# Patient Record
Sex: MALE | Race: OTHER | HISPANIC OR LATINO
[De-identification: names, ages, dates, MRNs, and addresses within clinical notes are randomized per-mention and may not be internally consistent; named-entity substitution may affect disease eponyms.]

---

## 2017-01-18 ENCOUNTER — APPOINTMENT (OUTPATIENT)
Dept: FAMILY MEDICINE | Facility: CLINIC | Age: 52
End: 2017-01-18

## 2017-03-22 ENCOUNTER — APPOINTMENT (OUTPATIENT)
Dept: FAMILY MEDICINE | Facility: CLINIC | Age: 52
End: 2017-03-22

## 2017-03-22 VITALS
HEART RATE: 63 BPM | HEIGHT: 64 IN | DIASTOLIC BLOOD PRESSURE: 71 MMHG | BODY MASS INDEX: 30.73 KG/M2 | WEIGHT: 180 LBS | OXYGEN SATURATION: 97 % | SYSTOLIC BLOOD PRESSURE: 118 MMHG | TEMPERATURE: 98.7 F

## 2017-03-22 DIAGNOSIS — Z01.818 ENCOUNTER FOR OTHER PREPROCEDURAL EXAMINATION: ICD-10-CM

## 2017-04-10 ENCOUNTER — APPOINTMENT (OUTPATIENT)
Dept: CT IMAGING | Facility: CLINIC | Age: 52
End: 2017-04-10

## 2017-05-03 ENCOUNTER — APPOINTMENT (OUTPATIENT)
Dept: FAMILY MEDICINE | Facility: CLINIC | Age: 52
End: 2017-05-03
Payer: COMMERCIAL

## 2017-05-03 VITALS
DIASTOLIC BLOOD PRESSURE: 74 MMHG | BODY MASS INDEX: 30.73 KG/M2 | SYSTOLIC BLOOD PRESSURE: 121 MMHG | OXYGEN SATURATION: 98 % | HEIGHT: 64 IN | TEMPERATURE: 97.8 F | HEART RATE: 67 BPM | WEIGHT: 180 LBS

## 2017-05-03 DIAGNOSIS — Z91.89 OTHER SPECIFIED PERSONAL RISK FACTORS, NOT ELSEWHERE CLASSIFIED: ICD-10-CM

## 2017-05-03 DIAGNOSIS — L60.0 INGROWING NAIL: ICD-10-CM

## 2017-05-03 DIAGNOSIS — Z23 ENCOUNTER FOR IMMUNIZATION: ICD-10-CM

## 2017-05-03 PROCEDURE — 94010 BREATHING CAPACITY TEST: CPT

## 2017-05-03 PROCEDURE — 99213 OFFICE O/P EST LOW 20 MIN: CPT | Mod: 25

## 2017-05-03 PROCEDURE — 99396 PREV VISIT EST AGE 40-64: CPT | Mod: 25

## 2017-05-03 PROCEDURE — 93000 ELECTROCARDIOGRAM COMPLETE: CPT

## 2017-05-21 ENCOUNTER — FORM ENCOUNTER (OUTPATIENT)
Age: 52
End: 2017-05-21

## 2017-05-22 ENCOUNTER — APPOINTMENT (OUTPATIENT)
Dept: CT IMAGING | Facility: CLINIC | Age: 52
End: 2017-05-22

## 2017-05-22 ENCOUNTER — OUTPATIENT (OUTPATIENT)
Dept: OUTPATIENT SERVICES | Facility: HOSPITAL | Age: 52
LOS: 1 days | End: 2017-05-22
Payer: COMMERCIAL

## 2017-05-22 DIAGNOSIS — R19.01 RIGHT UPPER QUADRANT ABDOMINAL SWELLING, MASS AND LUMP: ICD-10-CM

## 2017-05-22 PROCEDURE — 74177 CT ABD & PELVIS W/CONTRAST: CPT

## 2017-05-22 PROCEDURE — 71046 X-RAY EXAM CHEST 2 VIEWS: CPT

## 2017-06-19 ENCOUNTER — APPOINTMENT (OUTPATIENT)
Dept: FAMILY MEDICINE | Facility: CLINIC | Age: 52
End: 2017-06-19

## 2017-07-23 ENCOUNTER — RX RENEWAL (OUTPATIENT)
Age: 52
End: 2017-07-23

## 2017-08-26 ENCOUNTER — RX RENEWAL (OUTPATIENT)
Age: 52
End: 2017-08-26

## 2017-08-31 ENCOUNTER — APPOINTMENT (OUTPATIENT)
Dept: GASTROENTEROLOGY | Facility: CLINIC | Age: 52
End: 2017-08-31

## 2017-11-16 ENCOUNTER — EMERGENCY (EMERGENCY)
Facility: HOSPITAL | Age: 52
LOS: 1 days | Discharge: HOME | End: 2017-11-16
Attending: EMERGENCY MEDICINE
Payer: COMMERCIAL

## 2017-11-16 VITALS
HEART RATE: 70 BPM | DIASTOLIC BLOOD PRESSURE: 75 MMHG | HEIGHT: 62 IN | OXYGEN SATURATION: 99 % | TEMPERATURE: 98 F | SYSTOLIC BLOOD PRESSURE: 116 MMHG | WEIGHT: 134.92 LBS | RESPIRATION RATE: 18 BRPM

## 2017-11-16 PROCEDURE — 99283 EMERGENCY DEPT VISIT LOW MDM: CPT | Mod: 25

## 2017-11-16 PROCEDURE — 12041 INTMD RPR N-HF/GENIT 2.5CM/<: CPT

## 2017-11-16 PROCEDURE — 99284 EMERGENCY DEPT VISIT MOD MDM: CPT | Mod: 25

## 2017-11-16 PROCEDURE — 73140 X-RAY EXAM OF FINGER(S): CPT

## 2017-11-16 PROCEDURE — 73140 X-RAY EXAM OF FINGER(S): CPT | Mod: 26,LT

## 2017-11-16 RX ORDER — CEPHALEXIN 500 MG
1 CAPSULE ORAL
Qty: 21 | Refills: 0 | OUTPATIENT
Start: 2017-11-16 | End: 2017-11-23

## 2017-11-16 RX ORDER — IBUPROFEN 200 MG
1 TABLET ORAL
Qty: 40 | Refills: 0 | OUTPATIENT
Start: 2017-11-16 | End: 2017-11-26

## 2017-11-16 RX ORDER — IBUPROFEN 200 MG
1 TABLET ORAL
Qty: 40 | Refills: 0
Start: 2017-11-16 | End: 2017-11-26

## 2017-11-16 RX ORDER — CEPHALEXIN 500 MG
1 CAPSULE ORAL
Qty: 21 | Refills: 0
Start: 2017-11-16 | End: 2017-11-23

## 2017-11-16 NOTE — ED STATDOCS - ATTENDING CONTRIBUTION TO CARE
I, Magdy Aguilar, performed the initial face to face bedside interview with this patient regarding history of present illness, review of symptoms and relevant past medical, social and family history.  I completed an independent physical examination.  I was the initial provider who evaluated this patient. I have signed out the follow up of any pending tests (i.e. labs, radiological studies) to the ACP.  I have communicated the patient’s plan of care and disposition with the ACP.

## 2017-11-16 NOTE — ED ADULT TRIAGE NOTE - CHIEF COMPLAINT QUOTE
Patient arrived to ED today with c/o fracture to his left phalanx.  patient was sent from [facility] for evaluation.

## 2017-11-16 NOTE — ED STATDOCS - OBJECTIVE STATEMENT
53 y/o M pt presents to ED c/o right thumb pain s/p getting his thumb stuck in a machine at work today.Pt is right hand dominant. Tetanus is UTD as of today s/p going to an Urgent Care. He also had an XR done that showed a fracture but unsure where. He was also given and Denies N/V/D, fever, chills, SOB, CP, and abdominal pain. No further complaints at this time.

## 2017-11-16 NOTE — ED ADULT NURSE NOTE - CHIEF COMPLAINT QUOTE
Patient arrived to ED today with c/o fracture to his left phalanx.  patient was sent from [facility] for evaluation.

## 2017-11-16 NOTE — ED STATDOCS - MEDICAL DECISION MAKING DETAILS
Crushed typed injury to tip of left thumb sent from Urgent care center for management. Broken none? of distal phalanx  with laceration involving nail bed Cd to radiology to upload films. Irrigate sutures with nail repair ?

## 2018-01-26 ENCOUNTER — APPOINTMENT (OUTPATIENT)
Dept: FAMILY MEDICINE | Facility: CLINIC | Age: 53
End: 2018-01-26
Payer: COMMERCIAL

## 2018-01-26 VITALS
HEART RATE: 65 BPM | BODY MASS INDEX: 31.58 KG/M2 | SYSTOLIC BLOOD PRESSURE: 112 MMHG | DIASTOLIC BLOOD PRESSURE: 72 MMHG | HEIGHT: 64 IN | TEMPERATURE: 97.7 F | OXYGEN SATURATION: 97 % | WEIGHT: 185 LBS

## 2018-01-26 DIAGNOSIS — J06.9 ACUTE UPPER RESPIRATORY INFECTION, UNSPECIFIED: ICD-10-CM

## 2018-01-26 DIAGNOSIS — Z12.11 ENCOUNTER FOR SCREENING FOR MALIGNANT NEOPLASM OF COLON: ICD-10-CM

## 2018-01-26 DIAGNOSIS — H57.8 OTHER SPECIFIED DISORDERS OF EYE AND ADNEXA: ICD-10-CM

## 2018-01-26 DIAGNOSIS — R19.01 RIGHT UPPER QUADRANT ABDOMINAL SWELLING, MASS AND LUMP: ICD-10-CM

## 2018-01-26 PROCEDURE — G0008: CPT

## 2018-01-26 PROCEDURE — 99215 OFFICE O/P EST HI 40 MIN: CPT | Mod: 25

## 2018-01-26 PROCEDURE — 90686 IIV4 VACC NO PRSV 0.5 ML IM: CPT

## 2018-01-26 RX ORDER — ERGOCALCIFEROL 1.25 MG/1
1.25 MG CAPSULE, LIQUID FILLED ORAL
Qty: 12 | Refills: 0 | Status: DISCONTINUED | COMMUNITY
Start: 2017-05-03 | End: 2018-01-26

## 2018-01-26 RX ORDER — MUPIROCIN 20 MG/G
2 OINTMENT TOPICAL
Qty: 1 | Refills: 0 | Status: DISCONTINUED | COMMUNITY
Start: 2017-05-03 | End: 2018-01-26

## 2018-01-26 RX ORDER — AZITHROMYCIN DIHYDRATE 250 MG/1
250 TABLET, FILM COATED ORAL
Qty: 1 | Refills: 0 | Status: DISCONTINUED | COMMUNITY
Start: 2017-05-03 | End: 2018-01-26

## 2018-01-28 PROBLEM — H57.8 CYST OF EYE: Status: RESOLVED | Noted: 2017-05-03 | Resolved: 2018-01-28

## 2018-01-28 PROBLEM — R19.01 RIGHT UPPER QUADRANT ABDOMINAL MASS: Status: RESOLVED | Noted: 2017-03-22 | Resolved: 2018-01-28

## 2018-01-28 PROBLEM — J06.9 ACUTE URI: Status: RESOLVED | Noted: 2017-05-03 | Resolved: 2018-01-28

## 2018-02-06 LAB — HEMOCCULT STL QL IA: NEGATIVE

## 2018-05-04 ENCOUNTER — APPOINTMENT (OUTPATIENT)
Dept: INTERNAL MEDICINE | Facility: CLINIC | Age: 53
End: 2018-05-04

## 2018-09-04 ENCOUNTER — RX RENEWAL (OUTPATIENT)
Age: 53
End: 2018-09-04

## 2018-11-06 ENCOUNTER — APPOINTMENT (OUTPATIENT)
Dept: INTERNAL MEDICINE | Facility: CLINIC | Age: 53
End: 2018-11-06
Payer: COMMERCIAL

## 2018-11-06 VITALS
OXYGEN SATURATION: 98 % | WEIGHT: 184 LBS | HEART RATE: 74 BPM | BODY MASS INDEX: 31.41 KG/M2 | SYSTOLIC BLOOD PRESSURE: 110 MMHG | DIASTOLIC BLOOD PRESSURE: 72 MMHG | TEMPERATURE: 98.6 F | HEIGHT: 64 IN

## 2018-11-06 DIAGNOSIS — Z92.29 PERSONAL HISTORY OF OTHER DRUG THERAPY: ICD-10-CM

## 2018-11-06 PROCEDURE — 90686 IIV4 VACC NO PRSV 0.5 ML IM: CPT

## 2018-11-06 PROCEDURE — 99214 OFFICE O/P EST MOD 30 MIN: CPT | Mod: 25

## 2018-11-06 PROCEDURE — 36415 COLL VENOUS BLD VENIPUNCTURE: CPT

## 2018-11-06 PROCEDURE — G0008: CPT

## 2018-11-06 RX ORDER — CLOTRIMAZOLE 10 MG/G
1 CREAM TOPICAL
Qty: 1 | Refills: 0 | Status: DISCONTINUED | COMMUNITY
Start: 2018-01-26 | End: 2018-11-06

## 2018-11-06 RX ORDER — BENZONATATE 100 MG/1
100 CAPSULE ORAL 3 TIMES DAILY
Qty: 30 | Refills: 0 | Status: DISCONTINUED | COMMUNITY
Start: 2018-01-26 | End: 2018-11-06

## 2018-11-06 NOTE — PHYSICAL EXAM
[General Appearance - Alert] : alert [General Appearance - In No Acute Distress] : in no acute distress [Sclera] : the sclera and conjunctiva were normal [PERRL With Normal Accommodation] : pupils were equal in size, round, and reactive to light [Outer Ear] : the ears and nose were normal in appearance [Both Tympanic Membranes Were Examined] : both tympanic membranes were normal [Oropharynx] : the oropharynx was normal [Neck Appearance] : the appearance of the neck was normal [Neck Cervical Mass (___cm)] : no neck mass was observed [Jugular Venous Distention Increased] : there was no jugular-venous distention [Thyroid Diffuse Enlargement] : the thyroid was not enlarged [Thyroid Nodule] : there were no palpable thyroid nodules [Auscultation Breath Sounds / Voice Sounds] : lungs were clear to auscultation bilaterally [Heart Rate And Rhythm] : heart rate was normal and rhythm regular [Heart Sounds] : normal S1 and S2 [Heart Sounds Gallop] : no gallops [Murmurs] : no murmurs [Heart Sounds Pericardial Friction Rub] : no pericardial rub [Edema] : there was no peripheral edema [Bowel Sounds] : normal bowel sounds [Abdomen Soft] : soft [Abdomen Tenderness] : non-tender [Abdomen Mass (___ Cm)] : no abdominal mass palpated [No Spinal Tenderness] : no spinal tenderness [Nail Clubbing] : no clubbing  or cyanosis of the fingernails [No Focal Deficits] : no focal deficits [Oriented To Time, Place, And Person] : oriented to person, place, and time [Affect] : the affect was normal [Mood] : the mood was normal [] : no rash [FreeTextEntry1] : PHQ 2 score 0 1/26/2018

## 2018-11-06 NOTE — ASSESSMENT
[FreeTextEntry1] : \par \par Right sided Pulmonary nodule 6mm:\par -will order CT chest again today\par \par \par GERD:\par -previously seen by GI and he had EGD which showed gastritis, HH, esophagitis\par -he uses pantoprazole prn-will refill\par \par Pre-DM/Obesity:\par -Hg A1c was 6.2 3/2017\par -I adv low fat/low chol diet and weight loss\par -will check fasting labs\par \par Elevated triglycerides:\par -will check fasting labs\par -I adv low fat/low chol diet and weight loss\par \par Vitamin D Deficiency:\par -will check vitamin D 25-OH level\par \par Foot dermatitis:\par -no rash on feet currently seen\par -he states it usually occurs on top of feet and ankles\par -will refill clobetasol ointment for prn use  (R/B/A/side effects discussed)\par -he has been referred to dermatology\par \par Atypical nevus: right shoulder\par -referred to dermatology as he may need excision and bx given dark color\par -importance of f//u with dermatology to r/o skin cancer discussed with pt\par \par HCM:\par \par CPE: 5/3/2017-advised CPE\par \par EK/3/2017 \par \par Influenza vaccine: advised.  R/B discussed.  VIS given  Flu shot given today 2018\par \par Tdap: 2014\par \par HIV test: negative  3/22/2017 negative-offered again today 2018 and he consented to testing\par \par Hepatitis C screenin2016\par \par Depression screenin2018-PHQ 9 score 4-he states he does not feel depressed or anxious\par \par Colonoscopy: advised again today-referred to GI today. \par \par FIT testin2018 negative\par \par PSA 0.6 3/2017-will check PSA\par \par \par F/U 3-4 months for CPE.  fasting labs drawn today

## 2018-11-06 NOTE — HISTORY OF PRESENT ILLNESS
[de-identified] : He is here for follow up\par \par He states he left for [facility], [place] and he did not get a chance to do lab and CT chest I ordered previously\par \par He states he needs clobetosol ointment refilled which he uses as needed for itchy foot dermatitis.  he is currently asymptomatic\par \par he states he has had mild irritation on inside of B/l nostrils.  he state sin mast i prescribed bactroban ointment and it went away.  he denies fever/chills, nasal congestion or sore throat\par \par he states he otherwise feels fine.  he is fasting today and requests fasting labs

## 2018-11-06 NOTE — REVIEW OF SYSTEMS
[see HPI] : see HPI [Negative] : Respiratory [Fever] : no fever [Chills] : no chills [Feeling Poorly] : not feeling poorly [Feeling Tired] : not feeling tired [Recent Weight Loss (___ Lbs)] : no recent weight loss [Earache] : no earache [Nasal Discharge] : no nasal discharge [Sore Throat] : no sore throat [Chest Pain] : no chest pain [Palpitations] : no palpitations [Lower Ext Edema] : no extremity edema [Shortness Of Breath] : no shortness of breath [Cough] : no cough [Wheezing] : no wheezing [SOB on Exertion] : no shortness of breath during exertion [Abdominal Pain] : no abdominal pain [Vomiting] : no vomiting [Diarrhea] : no diarrhea [Heartburn] : no heartburn [Testicular Pain] : no testicular pain [Sleep Disturbances] : no sleep disturbances [Anxiety] : no anxiety [Depression] : no depression

## 2018-11-11 LAB
25(OH)D3 SERPL-MCNC: 17.8 NG/ML
ALBUMIN SERPL ELPH-MCNC: 4.6 G/DL
ALP BLD-CCNC: 62 U/L
ALT SERPL-CCNC: 25 U/L
ANION GAP SERPL CALC-SCNC: 13 MMOL/L
APPEARANCE: CLEAR
AST SERPL-CCNC: 26 U/L
BACTERIA: NEGATIVE
BASOPHILS # BLD AUTO: 0.04 K/UL
BASOPHILS NFR BLD AUTO: 0.7 %
BILIRUB SERPL-MCNC: 0.5 MG/DL
BILIRUBIN URINE: NEGATIVE
BLOOD URINE: NEGATIVE
BUN SERPL-MCNC: 22 MG/DL
CALCIUM SERPL-MCNC: 9.9 MG/DL
CHLORIDE SERPL-SCNC: 102 MMOL/L
CHOLEST SERPL-MCNC: 194 MG/DL
CHOLEST/HDLC SERPL: 4.6 RATIO
CO2 SERPL-SCNC: 22 MMOL/L
COLOR: YELLOW
CREAT SERPL-MCNC: 1.07 MG/DL
EOSINOPHIL # BLD AUTO: 0.07 K/UL
EOSINOPHIL NFR BLD AUTO: 1.3 %
GLUCOSE QUALITATIVE U: NEGATIVE MG/DL
GLUCOSE SERPL-MCNC: 123 MG/DL
HBA1C MFR BLD HPLC: 6.8 %
HCT VFR BLD CALC: 48.1 %
HDLC SERPL-MCNC: 42 MG/DL
HGB BLD-MCNC: 15.9 G/DL
HIV1+2 AB SPEC QL IA.RAPID: NONREACTIVE
IMM GRANULOCYTES NFR BLD AUTO: 0.4 %
KETONES URINE: NEGATIVE
LDLC SERPL CALC-MCNC: 116 MG/DL
LEUKOCYTE ESTERASE URINE: NEGATIVE
LYMPHOCYTES # BLD AUTO: 1.98 K/UL
LYMPHOCYTES NFR BLD AUTO: 36.6 %
MAN DIFF?: NORMAL
MCHC RBC-ENTMCNC: 30.1 PG
MCHC RBC-ENTMCNC: 33.1 GM/DL
MCV RBC AUTO: 91.1 FL
MICROSCOPIC-UA: NORMAL
MONOCYTES # BLD AUTO: 0.33 K/UL
MONOCYTES NFR BLD AUTO: 6.1 %
NEUTROPHILS # BLD AUTO: 2.97 K/UL
NEUTROPHILS NFR BLD AUTO: 54.9 %
NITRITE URINE: NEGATIVE
PH URINE: 5
PLATELET # BLD AUTO: 287 K/UL
POTASSIUM SERPL-SCNC: 4.3 MMOL/L
PROT SERPL-MCNC: 8.2 G/DL
PROTEIN URINE: NEGATIVE MG/DL
PSA SERPL-MCNC: 0.77 NG/ML
RBC # BLD: 5.28 M/UL
RBC # FLD: 13.2 %
RED BLOOD CELLS URINE: 0 /HPF
SODIUM SERPL-SCNC: 137 MMOL/L
SPECIFIC GRAVITY URINE: 1.02
SQUAMOUS EPITHELIAL CELLS: 0 /HPF
T4 FREE SERPL-MCNC: 1.2 NG/DL
TRIGL SERPL-MCNC: 179 MG/DL
TSH SERPL-ACNC: 1.98 UIU/ML
UROBILINOGEN URINE: NEGATIVE MG/DL
WBC # FLD AUTO: 5.41 K/UL
WHITE BLOOD CELLS URINE: 0 /HPF

## 2018-12-10 ENCOUNTER — FORM ENCOUNTER (OUTPATIENT)
Age: 53
End: 2018-12-10

## 2018-12-11 ENCOUNTER — APPOINTMENT (OUTPATIENT)
Dept: CT IMAGING | Facility: CLINIC | Age: 53
End: 2018-12-11
Payer: COMMERCIAL

## 2018-12-11 ENCOUNTER — OUTPATIENT (OUTPATIENT)
Dept: OUTPATIENT SERVICES | Facility: HOSPITAL | Age: 53
LOS: 1 days | End: 2018-12-11
Payer: COMMERCIAL

## 2018-12-11 DIAGNOSIS — R91.1 SOLITARY PULMONARY NODULE: ICD-10-CM

## 2018-12-11 PROCEDURE — 71250 CT THORAX DX C-: CPT

## 2018-12-11 PROCEDURE — 71250 CT THORAX DX C-: CPT | Mod: 26

## 2019-01-22 ENCOUNTER — APPOINTMENT (OUTPATIENT)
Dept: GASTROENTEROLOGY | Facility: CLINIC | Age: 54
End: 2019-01-22
Payer: COMMERCIAL

## 2019-01-22 ENCOUNTER — RX RENEWAL (OUTPATIENT)
Age: 54
End: 2019-01-22

## 2019-01-22 VITALS
DIASTOLIC BLOOD PRESSURE: 81 MMHG | OXYGEN SATURATION: 98 % | WEIGHT: 180 LBS | SYSTOLIC BLOOD PRESSURE: 122 MMHG | HEART RATE: 72 BPM | BODY MASS INDEX: 30.73 KG/M2 | HEIGHT: 64 IN | RESPIRATION RATE: 15 BRPM

## 2019-01-22 PROCEDURE — 99205 OFFICE O/P NEW HI 60 MIN: CPT

## 2019-01-22 NOTE — ASSESSMENT
[FreeTextEntry1] : GERD: Start pantoprazole 40 mg daily, schedule EGD\par \par CRC screening: Will schedule for routine screening colonoscopy.  TriLyte prep.\par

## 2019-01-22 NOTE — HISTORY OF PRESENT ILLNESS
[de-identified] : This is a 53-year-old Taiwanese-speaking only man with a past medical history of GERD, T2DM, and HLD who presents for evaluation for gastritis and a screening colonoscopy.  The patient reports an ongoing burning sensation that is worsened by eating spicy, acidic foods.  Nothing seems to make the pain better.  He underwent an EGD some years ago but cannot recall the specific results.\par \par He has never undergone a screening colonoscopy.  He denies any family history of colorectal cancer, melena or rectal bleeding.  He denies any abdominal pain that is not related to his epigastric burning.

## 2019-01-22 NOTE — PHYSICAL EXAM
[General Appearance - Alert] : alert [General Appearance - In No Acute Distress] : in no acute distress [Sclera] : the sclera and conjunctiva were normal [PERRL With Normal Accommodation] : pupils were equal in size, round, and reactive to light [Extraocular Movements] : extraocular movements were intact [Outer Ear] : the ears and nose were normal in appearance [Oropharynx] : the oropharynx was normal [Neck Appearance] : the appearance of the neck was normal [Neck Cervical Mass (___cm)] : no neck mass was observed [Jugular Venous Distention Increased] : there was no jugular-venous distention [Thyroid Diffuse Enlargement] : the thyroid was not enlarged [Thyroid Nodule] : there were no palpable thyroid nodules [Auscultation Breath Sounds / Voice Sounds] : lungs were clear to auscultation bilaterally [Heart Rate And Rhythm] : heart rate was normal and rhythm regular [Heart Sounds] : normal S1 and S2 [Heart Sounds Gallop] : no gallops [Murmurs] : no murmurs [Heart Sounds Pericardial Friction Rub] : no pericardial rub [Edema] : there was no peripheral edema [Bowel Sounds] : normal bowel sounds [Abdomen Soft] : soft [Abdomen Tenderness] : non-tender [] : no hepato-splenomegaly [Abdomen Mass (___ Cm)] : no abdominal mass palpated [Cervical Lymph Nodes Enlarged Posterior Bilaterally] : posterior cervical [Cervical Lymph Nodes Enlarged Anterior Bilaterally] : anterior cervical [Supraclavicular Lymph Nodes Enlarged Bilaterally] : supraclavicular [Nail Clubbing] : no clubbing  or cyanosis of the fingernails [Skin Color & Pigmentation] : normal skin color and pigmentation [Skin Turgor] : normal skin turgor [No Focal Deficits] : no focal deficits [Oriented To Time, Place, And Person] : oriented to person, place, and time [Impaired Insight] : insight and judgment were intact [Affect] : the affect was normal

## 2019-02-05 ENCOUNTER — APPOINTMENT (OUTPATIENT)
Dept: INTERNAL MEDICINE | Facility: CLINIC | Age: 54
End: 2019-02-05
Payer: COMMERCIAL

## 2019-02-05 ENCOUNTER — NON-APPOINTMENT (OUTPATIENT)
Age: 54
End: 2019-02-05

## 2019-02-05 VITALS
HEART RATE: 84 BPM | BODY MASS INDEX: 29.37 KG/M2 | HEIGHT: 64 IN | OXYGEN SATURATION: 95 % | DIASTOLIC BLOOD PRESSURE: 80 MMHG | SYSTOLIC BLOOD PRESSURE: 126 MMHG | WEIGHT: 172 LBS | RESPIRATION RATE: 15 BRPM | TEMPERATURE: 98.1 F

## 2019-02-05 DIAGNOSIS — Z87.2 PERSONAL HISTORY OF DISEASES OF THE SKIN AND SUBCUTANEOUS TISSUE: ICD-10-CM

## 2019-02-05 DIAGNOSIS — R91.1 SOLITARY PULMONARY NODULE: ICD-10-CM

## 2019-02-05 DIAGNOSIS — L30.9 DERMATITIS, UNSPECIFIED: ICD-10-CM

## 2019-02-05 PROCEDURE — 36415 COLL VENOUS BLD VENIPUNCTURE: CPT

## 2019-02-05 PROCEDURE — 93000 ELECTROCARDIOGRAM COMPLETE: CPT

## 2019-02-05 PROCEDURE — 99396 PREV VISIT EST AGE 40-64: CPT | Mod: 25

## 2019-02-05 PROCEDURE — 99213 OFFICE O/P EST LOW 20 MIN: CPT | Mod: 25

## 2019-02-05 NOTE — PHYSICAL EXAM
[General Appearance - Alert] : alert [General Appearance - In No Acute Distress] : in no acute distress [Sclera] : the sclera and conjunctiva were normal [PERRL With Normal Accommodation] : pupils were equal in size, round, and reactive to light [Outer Ear] : the ears and nose were normal in appearance [Both Tympanic Membranes Were Examined] : both tympanic membranes were normal [Neck Appearance] : the appearance of the neck was normal [Neck Cervical Mass (___cm)] : no neck mass was observed [Jugular Venous Distention Increased] : there was no jugular-venous distention [Thyroid Diffuse Enlargement] : the thyroid was not enlarged [Thyroid Nodule] : there were no palpable thyroid nodules [Auscultation Breath Sounds / Voice Sounds] : lungs were clear to auscultation bilaterally [Heart Rate And Rhythm] : heart rate was normal and rhythm regular [Heart Sounds] : normal S1 and S2 [Heart Sounds Gallop] : no gallops [Murmurs] : no murmurs [Heart Sounds Pericardial Friction Rub] : no pericardial rub [Edema] : there was no peripheral edema [Bowel Sounds] : normal bowel sounds [Abdomen Soft] : soft [Abdomen Tenderness] : non-tender [Abdomen Mass (___ Cm)] : no abdominal mass palpated [No Spinal Tenderness] : no spinal tenderness [Nail Clubbing] : no clubbing  or cyanosis of the fingernails [] : no rash [No Focal Deficits] : no focal deficits [Oriented To Time, Place, And Person] : oriented to person, place, and time [Affect] : the affect was normal [Mood] : the mood was normal [General Appearance - Well-Appearing] : healthy appearing [Extraocular Movements] : extraocular movements were intact [Arterial Pulses Carotid] : carotid pulses were normal with no bruits [No CVA Tenderness] : no ~M costovertebral angle tenderness [Musculoskeletal - Swelling] : no joint swelling seen [Right Foot Was Examined] : right foot was examined [Left Foot Was Examined] : left foot was examined [Normal Appearance] : normal in appearance [Normal in Appearance] : normal in appearance [Cranial Nerves] : cranial nerves 2-12 were intact [Motor Exam] : the motor exam was normal [FreeTextEntry1] : no calf tenderness [Tenderness] : not tender [Erythema] : not erythematous [Swelling] : not swollen [Diminished Throughout Right Foot] : normal tactile sensation with monofilament testing throughout right foot [Diminished Throughout Left Foot] : normal tactile sensation with monofilament testing throughout left foot

## 2019-02-05 NOTE — HISTORY OF PRESENT ILLNESS
[de-identified] : Here for CPE\par \par He states he has had URI and cough. HE states he has been sick for 2 weeks and he states it is not going away.  he went to store and bough "tetracycline" and states he took one pill but is has not helped.  No fever/chills.  He states he has stuffy nose.  He states he has sore throat.  No recent foreign tarvel.  No chest pain, sob, palpitations, wheezing.\par \par He states he has been eating healthier and going to gym.  He has lost some weight

## 2019-02-05 NOTE — REVIEW OF SYSTEMS
[Negative] : Psychiatric [Recent Weight Loss (___ Lbs)] : recent [unfilled] ~Ulb weight loss [see HPI] : see HPI [Cough] : cough [Fever] : no fever [Chills] : no chills [Feeling Poorly] : not feeling poorly [Feeling Tired] : not feeling tired [Sore Throat] : no sore throat [Chest Pain] : no chest pain [Palpitations] : no palpitations [Lower Ext Edema] : no extremity edema [Shortness Of Breath] : no shortness of breath [Wheezing] : no wheezing [SOB on Exertion] : no shortness of breath during exertion [Abdominal Pain] : no abdominal pain [Vomiting] : no vomiting [Diarrhea] : no diarrhea [Heartburn] : no heartburn [Testicular Pain] : no testicular pain [Anxiety] : no anxiety [Depression] : no depression

## 2019-02-05 NOTE — HEALTH RISK ASSESSMENT
[No falls in past year] : Patient reported no falls in the past year [0] : 2) Feeling down, depressed, or hopeless: Not at all (0) [HIV Test offered] : HIV Test offered [With Significant Other] : lives with significant other [With Family] : lives with family [Employed] : employed [Fully functional (bathing, dressing, toileting, transferring, walking, feeding)] : Fully functional (bathing, dressing, toileting, transferring, walking, feeding) [Fully functional (using the telephone, shopping, preparing meals, housekeeping, doing laundry, using] : Fully functional and needs no help or supervision to perform IADLs (using the telephone, shopping, preparing meals, housekeeping, doing laundry, using transportation, managing medications and managing finances) [] : No [de-identified] : none [de-identified] : socially [[id number]] : 0 [FreeTextEntry2] : Karl

## 2019-02-06 LAB
25(OH)D3 SERPL-MCNC: 38.4 NG/ML
ALBUMIN SERPL ELPH-MCNC: 4.9 G/DL
ALP BLD-CCNC: 58 U/L
ALT SERPL-CCNC: 28 U/L
ANION GAP SERPL CALC-SCNC: 12 MMOL/L
AST SERPL-CCNC: 31 U/L
BILIRUB SERPL-MCNC: 0.6 MG/DL
BUN SERPL-MCNC: 21 MG/DL
CALCIUM SERPL-MCNC: 9.8 MG/DL
CHLORIDE SERPL-SCNC: 106 MMOL/L
CHOLEST SERPL-MCNC: 128 MG/DL
CHOLEST/HDLC SERPL: 2.7 RATIO
CO2 SERPL-SCNC: 23 MMOL/L
CREAT SERPL-MCNC: 0.93 MG/DL
CREAT SPEC-SCNC: 103 MG/DL
GLUCOSE SERPL-MCNC: 110 MG/DL
HBA1C MFR BLD HPLC: 6.3 %
HDLC SERPL-MCNC: 47 MG/DL
LDLC SERPL CALC-MCNC: 66 MG/DL
MICROALBUMIN 24H UR DL<=1MG/L-MCNC: <1.2 MG/DL
MICROALBUMIN/CREAT 24H UR-RTO: NORMAL
POTASSIUM SERPL-SCNC: 4.3 MMOL/L
PROT SERPL-MCNC: 8.3 G/DL
SODIUM SERPL-SCNC: 141 MMOL/L
TRIGL SERPL-MCNC: 77 MG/DL

## 2019-02-12 ENCOUNTER — APPOINTMENT (OUTPATIENT)
Dept: DERMATOLOGY | Facility: CLINIC | Age: 54
End: 2019-02-12

## 2019-02-12 ENCOUNTER — APPOINTMENT (OUTPATIENT)
Dept: DERMATOLOGY | Facility: CLINIC | Age: 54
End: 2019-02-12
Payer: COMMERCIAL

## 2019-02-12 PROCEDURE — 99212 OFFICE O/P EST SF 10 MIN: CPT

## 2019-02-12 NOTE — PHYSICAL EXAM
[Alert] : alert [Oriented x 3] : ~L oriented x 3 [Well Nourished] : well nourished [Hair] : Hair [Face] : Face [Nose] : Nose [Eyelids] : Eyelids [Ears] : Ears [Lips] : Lips [Neck] : Neck [Chest] : Chest [Abdomen] : Abdomen [Back] : Back [R Arm] : R Arm [L Arm] : L Arm [FreeTextEntry3] : Right shoulder: 3 x 2 mm uniformly blue macule-not suspicious on dermoscopy

## 2019-02-12 NOTE — CONSULT LETTER
[Dear  ___] : Dear  [unfilled], [Consult Letter:] : I had the pleasure of evaluating your patient, [unfilled]. [Consult Closing:] : Thank you very much for allowing me to participate in the care of this patient.  If you have any questions, please do not hesitate to contact me. [Sincerely,] : Sincerely, [FreeTextEntry2] : Mikhail Ball DO [FreeTextEntry1] : He has a 3 x 2 mm uniformly blue macule on the right shoulder which is either a benign blue nevus or a traumatic tattoo. No treatment is necessary. [FreeTextEntry3] : Johnny Callejas MD\par [address], [address]\par [place] [address]\par Tel ([phone])\par Fax ([phone])\par Private line ([phone])\par

## 2019-02-12 NOTE — HISTORY OF PRESENT ILLNESS
[FreeTextEntry1] : Evaluation of growth on right shoulder [de-identified] : Persistent 89-year-old [place]ayan-speaking male referred by Mikhail Ball DO for evaluation of a "black spot" on the right shoulder - present for a few months. No tenderness or bleeding. No history of skin cancer.

## 2019-04-16 ENCOUNTER — RESULT REVIEW (OUTPATIENT)
Age: 54
End: 2019-04-16

## 2019-04-16 ENCOUNTER — APPOINTMENT (OUTPATIENT)
Dept: GASTROENTEROLOGY | Facility: GI CENTER | Age: 54
End: 2019-04-16
Payer: COMMERCIAL

## 2019-04-16 ENCOUNTER — OUTPATIENT (OUTPATIENT)
Dept: OUTPATIENT SERVICES | Facility: HOSPITAL | Age: 54
LOS: 1 days | End: 2019-04-16
Payer: COMMERCIAL

## 2019-04-16 DIAGNOSIS — K22.9 DISEASE OF ESOPHAGUS, UNSPECIFIED: ICD-10-CM

## 2019-04-16 DIAGNOSIS — Z87.898 PERSONAL HISTORY OF OTHER SPECIFIED CONDITIONS: ICD-10-CM

## 2019-04-16 DIAGNOSIS — Z87.19 PERSONAL HISTORY OF OTHER DISEASES OF THE DIGESTIVE SYSTEM: ICD-10-CM

## 2019-04-16 DIAGNOSIS — K22.10 ULCER OF ESOPHAGUS W/OUT BLEEDING: ICD-10-CM

## 2019-04-16 DIAGNOSIS — K29.70 GASTRITIS, UNSPECIFIED, W/OUT BLEEDING: ICD-10-CM

## 2019-04-16 DIAGNOSIS — K21.9 GASTRO-ESOPHAGEAL REFLUX DISEASE WITHOUT ESOPHAGITIS: ICD-10-CM

## 2019-04-16 LAB — GLUCOSE BLDC GLUCOMTR-MCNC: 112 MG/DL — HIGH (ref 70–99)

## 2019-04-16 PROCEDURE — 43239 EGD BIOPSY SINGLE/MULTIPLE: CPT

## 2019-04-16 PROCEDURE — 88342 IMHCHEM/IMCYTCHM 1ST ANTB: CPT

## 2019-04-16 PROCEDURE — 88342 IMHCHEM/IMCYTCHM 1ST ANTB: CPT | Mod: 26

## 2019-04-16 PROCEDURE — 88305 TISSUE EXAM BY PATHOLOGIST: CPT

## 2019-04-16 PROCEDURE — 82962 GLUCOSE BLOOD TEST: CPT

## 2019-04-16 PROCEDURE — 88305 TISSUE EXAM BY PATHOLOGIST: CPT | Mod: 26

## 2019-04-16 NOTE — PROCEDURE
[With Biopsy] : with biopsy [De Paz's] : De Paz's [Procedure Explained] : The procedure was explained [GERD] : GERD [Allergies Reviewed] : allergies reviewed. [Risks] : Risks [Alternatives] : alternatives [Benefits] : benefits [Automated Blood Pressure Cuff] : automated blood pressure cuff [Patient] : the patient [Consent Obtained] : written consent was obtained prior to the procedure and is detailed in the patient's record [Cardiac Monitor] : cardiac monitor [Versed ___ mg IV] : Versed [unfilled] ~Umg intravenously [Pulse Oximeter] : pulse oximeter [2] : 2 [Propofol ___ mg IV] : Propofol [unfilled] ~Umg intravenously [Sedation Clearance] : the patient was cleared for moderate sedation [Time started: ___] : Start Time:  [unfilled] [Time Completed: ___] : Completion Time:  [unfilled] [Performed By: ___] : Performed by:  ANGELA [Esophagitis] : esophagitis [Biopsy] : biopsy [Erythema] : erythema [Normal] : Normal [Sent to Pathology] : was sent to pathology for analysis [Tolerated Well] : the patient tolerated the procedure well [Vital Signs Stable] : the vital signs were stable [de-identified] : [id number] [de-identified] : Z-line at 40 cm from incisors

## 2019-04-16 NOTE — PROCEDURE
[With Biopsy] : with biopsy [Procedure Explained] : The procedure was explained [De Paz's] : De Paz's [GERD] : GERD [Allergies Reviewed] : allergies reviewed. [Risks] : Risks [Alternatives] : alternatives [Benefits] : benefits [Patient] : the patient [Automated Blood Pressure Cuff] : automated blood pressure cuff [Consent Obtained] : written consent was obtained prior to the procedure and is detailed in the patient's record [Pulse Oximeter] : pulse oximeter [Cardiac Monitor] : cardiac monitor [Versed ___ mg IV] : Versed [unfilled] ~Umg intravenously [Propofol ___ mg IV] : Propofol [unfilled] ~Umg intravenously [2] : 2 [Time Completed: ___] : Completion Time:  [unfilled] [Sedation Clearance] : the patient was cleared for moderate sedation [Time started: ___] : Start Time:  [unfilled] [Esophagitis] : esophagitis [Performed By: ___] : Performed by:  ANGELA [Biopsy] : biopsy [Erythema] : erythema [Normal] : Normal [Sent to Pathology] : was sent to pathology for analysis [Vital Signs Stable] : the vital signs were stable [Tolerated Well] : the patient tolerated the procedure well [de-identified] : [id number] [de-identified] : Z-line at 40 cm from incisors

## 2019-04-16 NOTE — ASSESSMENT
[FreeTextEntry1] : Esophagogastroduodenoscopy done to evaluate for patient's ongoing heartburn symptoms.  Exam notable for a slightly irregular Z line at 40 cm from the incisor teeth with some mild erosive esophagitis that was biopsied to evaluate for metaplastic changes.  The remainder of the exam was unremarkable striped erythematous gastropathy in the antrum.  Random biopsies were taken from the antrum, incisura, and body to rule out Helicobacter pylori.\par \par Followup pathology\par Continue proton pump inhibitor therapy

## 2019-04-18 LAB — SURGICAL PATHOLOGY STUDY: SIGNIFICANT CHANGE UP

## 2019-04-24 ENCOUNTER — OTHER (OUTPATIENT)
Age: 54
End: 2019-04-24

## 2019-05-04 ENCOUNTER — RX RENEWAL (OUTPATIENT)
Age: 54
End: 2019-05-04

## 2019-05-07 ENCOUNTER — APPOINTMENT (OUTPATIENT)
Dept: INTERNAL MEDICINE | Facility: CLINIC | Age: 54
End: 2019-05-07

## 2019-06-17 ENCOUNTER — APPOINTMENT (OUTPATIENT)
Dept: PULMONOLOGY | Facility: CLINIC | Age: 54
End: 2019-06-17

## 2019-09-10 ENCOUNTER — APPOINTMENT (OUTPATIENT)
Dept: INTERNAL MEDICINE | Facility: CLINIC | Age: 54
End: 2019-09-10

## 2019-10-02 ENCOUNTER — APPOINTMENT (OUTPATIENT)
Dept: INTERNAL MEDICINE | Facility: CLINIC | Age: 54
End: 2019-10-02
Payer: COMMERCIAL

## 2019-10-02 VITALS
SYSTOLIC BLOOD PRESSURE: 128 MMHG | DIASTOLIC BLOOD PRESSURE: 79 MMHG | WEIGHT: 178 LBS | HEART RATE: 77 BPM | OXYGEN SATURATION: 96 % | HEIGHT: 64 IN | TEMPERATURE: 98 F | BODY MASS INDEX: 30.39 KG/M2

## 2019-10-02 DIAGNOSIS — R73.03 PREDIABETES.: ICD-10-CM

## 2019-10-02 DIAGNOSIS — Z92.29 PERSONAL HISTORY OF OTHER DRUG THERAPY: ICD-10-CM

## 2019-10-02 DIAGNOSIS — Z87.09 PERSONAL HISTORY OF OTHER DISEASES OF THE RESPIRATORY SYSTEM: ICD-10-CM

## 2019-10-02 LAB — GLUCOSE BLDC GLUCOMTR-MCNC: 123

## 2019-10-02 PROCEDURE — 90472 IMMUNIZATION ADMIN EACH ADD: CPT

## 2019-10-02 PROCEDURE — 99214 OFFICE O/P EST MOD 30 MIN: CPT | Mod: 25

## 2019-10-02 PROCEDURE — 90471 IMMUNIZATION ADMIN: CPT

## 2019-10-02 PROCEDURE — 90674 CCIIV4 VAC NO PRSV 0.5 ML IM: CPT

## 2019-10-02 PROCEDURE — 90732 PPSV23 VACC 2 YRS+ SUBQ/IM: CPT

## 2019-10-02 PROCEDURE — 82962 GLUCOSE BLOOD TEST: CPT

## 2019-10-02 RX ORDER — ERGOCALCIFEROL 1.25 MG/1
1.25 MG CAPSULE, LIQUID FILLED ORAL
Qty: 12 | Refills: 0 | Status: DISCONTINUED | COMMUNITY
Start: 2018-11-11 | End: 2019-10-02

## 2019-10-02 RX ORDER — MUPIROCIN 20 MG/G
2 OINTMENT TOPICAL
Qty: 1 | Refills: 0 | Status: DISCONTINUED | COMMUNITY
Start: 2018-11-06 | End: 2019-10-02

## 2019-10-02 RX ORDER — AZITHROMYCIN 250 MG/1
250 TABLET, FILM COATED ORAL
Qty: 1 | Refills: 0 | Status: DISCONTINUED | COMMUNITY
Start: 2019-02-05 | End: 2019-10-02

## 2019-10-02 RX ORDER — METFORMIN ER 500 MG 500 MG/1
500 TABLET ORAL DAILY
Qty: 30 | Refills: 3 | Status: DISCONTINUED | COMMUNITY
Start: 2018-11-11 | End: 2019-10-02

## 2019-10-02 RX ORDER — CLOBETASOL PROPIONATE 0.5 MG/G
0.05 OINTMENT TOPICAL TWICE DAILY
Qty: 1 | Refills: 0 | Status: DISCONTINUED | COMMUNITY
Start: 2018-11-06 | End: 2019-10-02

## 2019-10-02 RX ORDER — ATORVASTATIN CALCIUM 10 MG/1
10 TABLET, FILM COATED ORAL DAILY
Qty: 30 | Refills: 3 | Status: DISCONTINUED | COMMUNITY
Start: 2018-11-11 | End: 2019-10-02

## 2019-10-02 NOTE — PHYSICAL EXAM
[No Acute Distress] : no acute distress [Well-Appearing] : well-appearing [Normal Voice/Communication] : normal voice/communication [Normal Sclera/Conjunctiva] : normal sclera/conjunctiva [PERRL] : pupils equal round and reactive to light [Normal Oropharynx] : the oropharynx was normal [No JVD] : no jugular venous distention [No Lymphadenopathy] : no lymphadenopathy [Supple] : supple [Thyroid Normal, No Nodules] : the thyroid was normal and there were no nodules present [No Respiratory Distress] : no respiratory distress  [No Accessory Muscle Use] : no accessory muscle use [Clear to Auscultation] : lungs were clear to auscultation bilaterally [Normal Rate] : normal rate  [Regular Rhythm] : with a regular rhythm [Normal S1, S2] : normal S1 and S2 [No Murmur] : no murmur heard [No Carotid Bruits] : no carotid bruits [No Edema] : there was no peripheral edema [Soft] : abdomen soft [Non Tender] : non-tender [Non-distended] : non-distended [No Masses] : no abdominal mass palpated [No HSM] : no HSM [Normal Bowel Sounds] : normal bowel sounds [No CVA Tenderness] : no CVA  tenderness [No Spinal Tenderness] : no spinal tenderness [No Joint Swelling] : no joint swelling [No Rash] : no rash [No Skin Lesions] : no skin lesions [No Focal Deficits] : no focal deficits [de-identified] : no calf tenderness

## 2019-10-02 NOTE — REVIEW OF SYSTEMS
[Fatigue] : fatigue [Negative] : Psychiatric [Fever] : no fever [Chills] : no chills [Recent Change In Weight] : ~T no recent weight change [Chest Pain] : no chest pain [Palpitations] : no palpitations [Lower Ext Edema] : no lower extremity edema [Shortness Of Breath] : no shortness of breath [Wheezing] : no wheezing [Cough] : no cough [Dyspnea on Exertion] : not dyspnea on exertion [Abdominal Pain] : no abdominal pain [Nausea] : no nausea [Diarrhea] : no diarrhea [Vomiting] : no vomiting [Skin Rash] : no skin rash [Anxiety] : no anxiety [Depression] : no depression

## 2019-10-02 NOTE — ASSESSMENT
[FreeTextEntry1] : \par Fatigue:\par -mild sx \par -will check labs\par -I advised he try and get plenty of rest and sleep\par \par GERD:\par -he uses pantoprazole prn\par -seen by GI and had EGD recently\par \par DM: last HgA1c 6.3 2019\par -he self d/c metformin and atorvastatin\par -A:C negative 2019\par -Lipids at goal 2019\par -will check lipids, HgA1c, and urine A:C\par -Foot exam 2019\par -ophthalmology referred today\par \par Obesity:\par -BMI 30\par -I adv low fat/low chol diet and weight loss\par \par Dyslipidemia\par -he self d/c atorvastatin\par -I adv low fat/low chol diet and weight loss\par -will check fasting labs\par \par Vitamin D Deficiency:\par -vitamin D 25-OH level 2019\par \par Atypical nevus: right shoulder\par -seen by dermatology and told lesion was blue nevus and not suspicious\par \par HCM:\par \par CPE: 2019\par \par EK2019\par \par Influenza vaccine: advised.  R/B discussed.  No egg allergy reported.  VIS given.  Flu shot given today 10/2/2019\par \par Tdap: 2014\par \par Pneumovax: advised.  R/B discussed.  VIS given.  Pneumovax given today 10/2/2019\par \par HIV test: 2018 negative\par \par Hepatitis C screenin2016\par \par Depression screenin2019 negative\par \par Colonoscopy:  sen by GI and colonoscopy is planned\par \par FIT testin2018 negative\par \par PSA 0.77 2018-will check PSA\par \par \par F/U 3 months.  Fasting labs ordered

## 2019-10-02 NOTE — HISTORY OF PRESENT ILLNESS
[de-identified] : Here for follow up\par \par he states he took metformin for 1 week but stopped it secondary to GI side effects\par \par he state she has been watching his diet and exercising\par \par he would like flu shot.  he denies egg allergy\par \par he feels a little tired sometimes.  HE states this occurs sporadically.  No chest pain, sob, palpitations

## 2019-10-18 ENCOUNTER — APPOINTMENT (OUTPATIENT)
Dept: GASTROENTEROLOGY | Facility: CLINIC | Age: 54
End: 2019-10-18

## 2019-12-01 RX ORDER — POLYETHYLENE GLYOCOL 3350, SODIUM CHLORIDE, SODIUM BICARBONATE AND POTASSIUM CHLORIDE 420; 11.2; 5.72; 1.48 G/4L; G/4L; G/4L; G/4L
420 POWDER, FOR SOLUTION NASOGASTRIC; ORAL
Qty: 1 | Refills: 0 | Status: COMPLETED | COMMUNITY
Start: 2019-01-22 | End: 2019-12-01

## 2019-12-02 ENCOUNTER — OUTPATIENT (OUTPATIENT)
Dept: OUTPATIENT SERVICES | Facility: HOSPITAL | Age: 54
LOS: 1 days | End: 2019-12-02
Payer: COMMERCIAL

## 2019-12-02 ENCOUNTER — APPOINTMENT (OUTPATIENT)
Dept: GASTROENTEROLOGY | Facility: GI CENTER | Age: 54
End: 2019-12-02
Payer: COMMERCIAL

## 2019-12-02 DIAGNOSIS — Z12.11 ENCOUNTER FOR SCREENING FOR MALIGNANT NEOPLASM OF COLON: ICD-10-CM

## 2019-12-02 PROCEDURE — 45378 DIAGNOSTIC COLONOSCOPY: CPT

## 2019-12-02 PROCEDURE — G0121: CPT

## 2019-12-02 NOTE — PROCEDURE
[Colon Cancer Screening] : colon cancer screening [Procedure Explained] : The procedure was explained [Allergies Reviewed] : allergies reviewed. [Risks] : Risks [Benefits] : benefits [Alternatives] : alternatives [Bleeding] : bleeding risk [Infection] : risk of infection [Consent Obtained] : written consent was obtained prior to the procedure and is detailed in the patient's record [Patient] : the patient [Bowel Prep Kit] : the patient took the appropriate bowel preparation kit as directed [Automated Blood Pressure Cuff] : automated blood pressure cuff [Cardiac Monitor] : cardiac monitor [Pulse Oximeter] : pulse oximeter [2] : 2 [Sedation Clearance] : the patient was cleared for moderate sedation [Performed By: ___] : Performed by:  ANGELA [Left Lateral Decubitus] : The patient was positioned in the left lateral decubitus position [Approved Diet Followed] : the patient did not avoid solid foods or adhere to the approved diet list for 24 hours prior to the procedure [Propofol ___ mg IV] : Propofol [unfilled] ~Umg intravenously [Abnormal Rectum] : a normal rectum [Withdrawal Time: ___] : Withdrawal Time:  [unfilled] [Prep Qualtiy: ___] : Prep Quality:  [unfilled] [Cecum (Landmarks)] : and guided to the cecum which was identified by the anatomic landmarks of the appendiceal orifice and ileocecal valve [External Hemorrhoids] : no external hemorrhoids [Normal Prostate] : a normal prostate [Insufflated] : insufflated [Single Pass Needed] : after a single pass [No Difficulty] : without difficulty [Tolerated Well] : the patient tolerated the procedure well [Retroflex View] : a retroflex view of the rectum was performed [Normal] : Normal [Vital Signs Stable] : the vital signs were stable [FreeTextEntry2] : [employer] [id number]  [No Complications] : There were no complications [de-identified] : Solid stool [de-identified] : Solid stool [de-identified] : Solid stool [de-identified] : Solid stool [de-identified] : Solid stool

## 2019-12-02 NOTE — PROCEDURE
[Colon Cancer Screening] : colon cancer screening [Procedure Explained] : The procedure was explained [Allergies Reviewed] : allergies reviewed. [Risks] : Risks [Benefits] : benefits [Alternatives] : alternatives [Bleeding] : bleeding risk [Infection] : risk of infection [Consent Obtained] : written consent was obtained prior to the procedure and is detailed in the patient's record [Patient] : the patient [Bowel Prep Kit] : the patient took the appropriate bowel preparation kit as directed [Automated Blood Pressure Cuff] : automated blood pressure cuff [Cardiac Monitor] : cardiac monitor [Pulse Oximeter] : pulse oximeter [2] : 2 [Sedation Clearance] : the patient was cleared for moderate sedation [Performed By: ___] : Performed by:  ANGELA [Left Lateral Decubitus] : The patient was positioned in the left lateral decubitus position [Approved Diet Followed] : the patient did not avoid solid foods or adhere to the approved diet list for 24 hours prior to the procedure [Propofol ___ mg IV] : Propofol [unfilled] ~Umg intravenously [Prep Qualtiy: ___] : Prep Quality:  [unfilled] [Withdrawal Time: ___] : Withdrawal Time:  [unfilled] [Abnormal Rectum] : a normal rectum [External Hemorrhoids] : no external hemorrhoids [Cecum (Landmarks)] : and guided to the cecum which was identified by the anatomic landmarks of the appendiceal orifice and ileocecal valve [Normal Prostate] : a normal prostate [No Difficulty] : without difficulty [Single Pass Needed] : after a single pass [Insufflated] : insufflated [Normal] : Normal [Tolerated Well] : the patient tolerated the procedure well [Retroflex View] : a retroflex view of the rectum was performed [Vital Signs Stable] : the vital signs were stable [No Complications] : There were no complications [FreeTextEntry2] : [employer] [id number]  [de-identified] : Solid stool [de-identified] : Solid stool [de-identified] : Solid stool [de-identified] : Solid stool [de-identified] : Solid stool

## 2019-12-02 NOTE — ASSESSMENT
[FreeTextEntry1] : This is a 54-year-old male presenting for index screening colonoscopy.  Exam was notable for a poor bowel prep with a large amount of residual solid stool seen all throughout the colon.  The visualized colon was grossly normal in appearance.\par \par Repeat colonoscopy in six months with two day bowel prep

## 2020-01-07 ENCOUNTER — APPOINTMENT (OUTPATIENT)
Dept: INTERNAL MEDICINE | Facility: CLINIC | Age: 55
End: 2020-01-07
Payer: COMMERCIAL

## 2020-01-07 VITALS
BODY MASS INDEX: 30.73 KG/M2 | OXYGEN SATURATION: 96 % | DIASTOLIC BLOOD PRESSURE: 79 MMHG | HEART RATE: 79 BPM | SYSTOLIC BLOOD PRESSURE: 131 MMHG | TEMPERATURE: 98.6 F | HEIGHT: 64 IN | WEIGHT: 180 LBS

## 2020-01-07 DIAGNOSIS — Z87.898 PERSONAL HISTORY OF OTHER SPECIFIED CONDITIONS: ICD-10-CM

## 2020-01-07 DIAGNOSIS — Z92.29 PERSONAL HISTORY OF OTHER DRUG THERAPY: ICD-10-CM

## 2020-01-07 DIAGNOSIS — Z12.11 ENCOUNTER FOR SCREENING FOR MALIGNANT NEOPLASM OF COLON: ICD-10-CM

## 2020-01-07 DIAGNOSIS — Z87.2 PERSONAL HISTORY OF DISEASES OF THE SKIN AND SUBCUTANEOUS TISSUE: ICD-10-CM

## 2020-01-07 DIAGNOSIS — D23.9 OTHER BENIGN NEOPLASM OF SKIN, UNSPECIFIED: ICD-10-CM

## 2020-01-07 PROCEDURE — 99214 OFFICE O/P EST MOD 30 MIN: CPT | Mod: 25

## 2020-01-07 PROCEDURE — G0447 BEHAVIOR COUNSEL OBESITY 15M: CPT

## 2020-01-07 PROCEDURE — 96127 BRIEF EMOTIONAL/BEHAV ASSMT: CPT

## 2020-01-07 NOTE — HISTORY OF PRESENT ILLNESS
[de-identified] : Here for follow up of DM\par \par He states he has been having left elbow pain x 4 weeks.  he denies injuries or trauma.  He denies repetitive movements.  he is not taking any medications for this.  he states going in jacuzzi and hot water makes it better

## 2020-01-07 NOTE — HEALTH RISK ASSESSMENT
[0] : 2) Feeling down, depressed, or hopeless: Not at all (0) [Patient reported colonoscopy was normal] : Patient reported colonoscopy was normal [[id number]] : 0 [ColonoscopyDate] : 12/19 [ColonoscopyComments] : Poor prep-needs repeat in 6 months

## 2020-01-07 NOTE — ASSESSMENT
[FreeTextEntry1] : \par Fatigue:\par -still has mild sx but attributes this to working\par -will repeat labs\par \par GERD:\par -pantoprazole prn\par \par DM: last HgA1c 6.2 10/2019-will check labs inclduing HgA1c and lipids\par -on no meds\par -A:C negative 10/2019\par -will check fasting labs\par -Foot exam 2020\par -ophthalmology: referred previously-will discuss again at next visit\par \par Obesity:\par -BMI 30.9\par -risks of obesity discussed\par -benefits of weight loss discussed\par -low fat/low chol diet and low carbohydrate diet advised\par -small portion sizes encouraged\par -I advised avoidance of sugary drinks\par -aerobic exercise encouraged\par -weight loss advised\par -counseling time 15 minutes\par \par Dyslipidemia\par -he self d/c atorvastatin\par -last \par -I adv low fat/low chol diet and weight loss\par -will check fasting labs\par \par Vitamin D Deficiency:\par -will check vitamin D 25-OH\par \par Left elbow pain:\par -he wants a topical medication-will give trial of voltaren gel for prn use\par -will check xray of left elbow\par -he is to notify office if sx persist or worsen\par \par HCM:\par \par CPE: 2019\par \par [id number]\par \par Influenza vaccine:  10/2/2019\par \par Tdap: 2014\par \par Pneumovax:  10/2/2019\par \par HIV test: 2018 negative-offered 2020-he declined\par \par Hepatitis C screenin2016\par \par Depression screenin2020-PHQ 2 score 0 negative\par \par Colonoscopy: 2019-poor prep-needs repeat in 6 months\par \par FIT testin2018 negative\par \par PSA 0.6 10/2019\par \par \par F/U 3 months.  Fasting labs ordered-he will have done at the lab

## 2020-01-07 NOTE — COUNSELING
[Benefits of weight loss discussed] : Benefits of weight loss discussed [Potential consequences of obesity discussed] : Potential consequences of obesity discussed [Encouraged to increase physical activity] : Encouraged to increase physical activity [Good understanding] : Patient has a good understanding of disease, goals and obesity follow-up plan [FreeTextEntry4] : 15 inutes

## 2020-01-07 NOTE — REVIEW OF SYSTEMS
[Fatigue] : fatigue [Negative] : Psychiatric [Chills] : no chills [Fever] : no fever [Recent Change In Weight] : ~T no recent weight change [Chest Pain] : no chest pain [Palpitations] : no palpitations [Lower Ext Edema] : no lower extremity edema [Shortness Of Breath] : no shortness of breath [Wheezing] : no wheezing [Dyspnea on Exertion] : not dyspnea on exertion [Cough] : no cough [Abdominal Pain] : no abdominal pain [Nausea] : no nausea [Diarrhea] : no diarrhea [Vomiting] : no vomiting [Anxiety] : no anxiety [Depression] : no depression [FreeTextEntry9] : see HPI

## 2020-01-30 ENCOUNTER — RX RENEWAL (OUTPATIENT)
Age: 55
End: 2020-01-30

## 2020-02-12 ENCOUNTER — APPOINTMENT (OUTPATIENT)
Dept: INTERNAL MEDICINE | Facility: CLINIC | Age: 55
End: 2020-02-12

## 2020-02-25 ENCOUNTER — APPOINTMENT (OUTPATIENT)
Dept: INTERNAL MEDICINE | Facility: CLINIC | Age: 55
End: 2020-02-25
Payer: COMMERCIAL

## 2020-02-25 VITALS
WEIGHT: 177 LBS | HEART RATE: 81 BPM | OXYGEN SATURATION: 97 % | DIASTOLIC BLOOD PRESSURE: 70 MMHG | TEMPERATURE: 98.1 F | RESPIRATION RATE: 15 BRPM | BODY MASS INDEX: 30.22 KG/M2 | HEIGHT: 64 IN | SYSTOLIC BLOOD PRESSURE: 112 MMHG

## 2020-02-25 DIAGNOSIS — Z87.898 PERSONAL HISTORY OF OTHER SPECIFIED CONDITIONS: ICD-10-CM

## 2020-02-25 DIAGNOSIS — M25.522 PAIN IN LEFT ELBOW: ICD-10-CM

## 2020-02-25 PROCEDURE — 99213 OFFICE O/P EST LOW 20 MIN: CPT

## 2020-02-25 NOTE — ASSESSMENT
[FreeTextEntry1] : \par Fatigue:\par -resolved overall\par \par GERD:\par -pantoprazole prn\par \par DM: last HgA1c 6.4 2020\par -on no meds\par -A:C negative 2020\par --I advised statin-will start atorvastatin 10mg daily  (R/B/A/side effects discussed)\par -Foot exam 2020\par -ophthalmology: 2019 no retinopathy reported by pt\par \par Obesity:\par -he lost 3 lbs\par -low fat/low chol diet and low carbohydrate diet advised\par -small portion sizes encouraged\par -I advised avoidance of sugary drinks\par -aerobic exercise encouraged\par -weight loss advised\par \par Dyslipidemia\par -recent lipids near goal but LDL was 112-given DM LDL goal in less than 70\par -Will start atorvastatin 10mg PO daily\par -I adv low fat/low chol diet and weight loss\par \par Vitamin D Deficiency:\par -I advised vitamin D3 1000 daily\par \par Left elbow pain:\par -he states sx have resolved\par \par HCM:\par \par CPE: 2019\par \par [id number]\par \par Influenza vaccine:  10/2/2019\par \par Tdap: 2014\par \par Pneumovax:  10/2/2019\par \par HIV test: 2018 negative-offered 2020-he declined\par \par Hepatitis C screenin2016\par \par Depression screenin2020-PHQ 2 score 0 negative\par \par Colonoscopy: 2019-poor prep-needs repeat in 6 months\par \par FIT testin2018 negative\par \par PSA 0.6 10/2019\par \par \par F/U 3 months for fasting labs

## 2020-02-25 NOTE — REVIEW OF SYSTEMS
[Negative] : Musculoskeletal [Fever] : no fever [Chills] : no chills [Fatigue] : no fatigue [Chest Pain] : no chest pain [Palpitations] : no palpitations [Lower Ext Edema] : no lower extremity edema [Wheezing] : no wheezing [Shortness Of Breath] : no shortness of breath [Cough] : no cough [Abdominal Pain] : no abdominal pain [Dyspnea on Exertion] : not dyspnea on exertion [Nausea] : no nausea [Diarrhea] : no diarrhea [Vomiting] : no vomiting

## 2020-02-25 NOTE — HISTORY OF PRESENT ILLNESS
[de-identified] : Here for follow up of labs\par \par He states he feels well\par \par No new complaints

## 2020-04-03 ENCOUNTER — APPOINTMENT (OUTPATIENT)
Dept: INTERNAL MEDICINE | Facility: CLINIC | Age: 55
End: 2020-04-03

## 2020-04-15 ENCOUNTER — APPOINTMENT (OUTPATIENT)
Dept: INTERNAL MEDICINE | Facility: CLINIC | Age: 55
End: 2020-04-15
Payer: COMMERCIAL

## 2020-04-15 PROCEDURE — G2012 BRIEF CHECK IN BY MD/QHP: CPT

## 2020-04-15 NOTE — HISTORY OF PRESENT ILLNESS
[Verbal consent obtained from patient] : the patient, [unfilled] [FreeTextEntry1] : Verbal consent obtained from patient for telemedicine/phone visit\par \par As this is phone visit no physical exam could be performed.  Diagnosis and treatment based upon symptoms provided\par \par Pt requested telephone visit to follow up oin his CVOID 19 dx and needs note to return to work\par \par He states his COVID 19 sx started around 3/20/2020.  he was tested in early April and reports test came back positive and has been in [employer] since his sx started\par \par he state she currently feels well and states he has not had any sx in 6 days and is ready to go back to work tomorrow\par \par Will write him work letter [No] : no difficulty breathing [None] : none [Improved] : improved [Time Spent: ___ minutes] : I have spent [unfilled] minutes with the patient on the telephone

## 2020-05-26 ENCOUNTER — APPOINTMENT (OUTPATIENT)
Dept: INTERNAL MEDICINE | Facility: CLINIC | Age: 55
End: 2020-05-26
Payer: COMMERCIAL

## 2020-05-26 VITALS
DIASTOLIC BLOOD PRESSURE: 78 MMHG | WEIGHT: 177 LBS | HEIGHT: 64 IN | BODY MASS INDEX: 30.22 KG/M2 | TEMPERATURE: 97.9 F | HEART RATE: 79 BPM | SYSTOLIC BLOOD PRESSURE: 122 MMHG | OXYGEN SATURATION: 99 %

## 2020-05-26 DIAGNOSIS — Z11.59 ENCOUNTER FOR SCREENING FOR OTHER VIRAL DISEASES: ICD-10-CM

## 2020-05-26 PROCEDURE — 99214 OFFICE O/P EST MOD 30 MIN: CPT | Mod: 25

## 2020-05-26 PROCEDURE — 36415 COLL VENOUS BLD VENIPUNCTURE: CPT

## 2020-05-26 NOTE — PHYSICAL EXAM
[No Acute Distress] : no acute distress [Well-Appearing] : well-appearing [Normal Voice/Communication] : normal voice/communication [Normal Sclera/Conjunctiva] : normal sclera/conjunctiva [PERRL] : pupils equal round and reactive to light [Normal Oropharynx] : the oropharynx was normal [No JVD] : no jugular venous distention [No Lymphadenopathy] : no lymphadenopathy [Supple] : supple [Thyroid Normal, No Nodules] : the thyroid was normal and there were no nodules present [No Respiratory Distress] : no respiratory distress  [No Accessory Muscle Use] : no accessory muscle use [Clear to Auscultation] : lungs were clear to auscultation bilaterally [Normal Rate] : normal rate  [Regular Rhythm] : with a regular rhythm [Normal S1, S2] : normal S1 and S2 [No Murmur] : no murmur heard [No Edema] : there was no peripheral edema [No Rash] : no rash [Normal Affect] : the affect was normal [Alert and Oriented x3] : oriented to person, place, and time [Normal Mood] : the mood was normal [Normal Insight/Judgement] : insight and judgment were intact [Normal Outer Ear/Nose] : the outer ears and nose were normal in appearance [Soft] : abdomen soft [Non Tender] : non-tender [Non-distended] : non-distended [No Masses] : no abdominal mass palpated [No HSM] : no HSM [Normal Bowel Sounds] : normal bowel sounds [No Spinal Tenderness] : no spinal tenderness [No Joint Swelling] : no joint swelling [Grossly Normal Strength/Tone] : grossly normal strength/tone [No Skin Lesions] : no skin lesions [No Focal Deficits] : no focal deficits [de-identified] : nasal mucosa is is mildly irriated along septum B/L with some mild crusting [de-identified] : no muscle tenderness

## 2020-05-26 NOTE — HISTORY OF PRESENT ILLNESS
[de-identified] : Harshad for follow up\par \par He states since having covid 19 in April 2020 he has had some left overs pains in his arms and legs.  He denies swelling.  He denies any recent truama.  He is not taking anything for his pain\par \par He states he stopped taking his atorvastatin\par \par He states his eyes sometimes feel like they are burning and states eyes sometimes feel "tired".  he denies vision chnages and denies eye redness\par \par he also reports mild intranasal irritation and crusting and would like a cream

## 2020-05-26 NOTE — REVIEW OF SYSTEMS
[Negative] : Neurological [Fever] : no fever [Chills] : no chills [Fatigue] : no fatigue [Recent Change In Weight] : ~T no recent weight change [Earache] : no earache [Nasal Discharge] : no nasal discharge [Sore Throat] : no sore throat [Chest Pain] : no chest pain [Palpitations] : no palpitations [Lower Ext Edema] : no lower extremity edema [Shortness Of Breath] : no shortness of breath [Wheezing] : no wheezing [Dyspnea on Exertion] : not dyspnea on exertion [Cough] : no cough [Abdominal Pain] : no abdominal pain [Nausea] : no nausea [Diarrhea] : no diarrhea [Vomiting] : no vomiting [Anxiety] : no anxiety [Depression] : no depression [FreeTextEntry3] : he states his eyes sometimes feel like they are burning and states eyes sometimes feel "tired" [FreeTextEntry4] : see HPI [FreeTextEntry9] : see HPI

## 2020-05-26 NOTE — ASSESSMENT
[FreeTextEntry1] : \par S/P covid 19:\par -feels better-he denies fever/chills, coug\par -will check covid 19 antibody\par \par B/L arm and leg pains:\par -vague sx\par -no significant findings on exam\par -will check labs\par -I advised him he could use tylenol OTC prn\par \par Eyes feels like they are burning and tired:\par -will refer to ophthalmology\par \par GERD:\par -pantoprazole prn\par \par DM: last HgA1c 6.4 2020-will check labs\par -on no meds\par -A:C negative 2020\par -he self d/c atorvastatin-will check lipids\par -Foot exam 2020\par -ophthalmology: 2019 no retinopathy reported by pt\par \par Dyslipidemia\par -will check lipids\par -he self d/c atorvastatin\par -I adv low fat/low chol diet and weight loss\par -will likely benefit from statin given DM\par \par Vitamin D Deficiency:\par -vitamin D3 1000 daily\par -will check vitamin D 25-OH\par \par \par HCM:\par \par CPE: 2019\par \par EK2019\par \par Influenza vaccine:  10/2/2019\par \par Tdap: 2014\par \par Pneumovax:  10/2/2019\par \par HIV test: 2018 negative-offered 2020-he declined\par \par Hepatitis C screenin2016\par \par Depression screenin2020-PHQ 2 score 0 negative\par \par Colonoscopy: 2019-poor prep-needs repeat in 6 months-will refer back to GI as he is due\par \par FIT testin2018 negative\par \par PSA 0.6 10/2019\par \par Covid 19 antibody test ordered today\par \par \par F/U 3 months.  he will have fasting labs done at the lab

## 2020-07-05 ENCOUNTER — EMERGENCY (EMERGENCY)
Facility: HOSPITAL | Age: 55
LOS: 1 days | Discharge: HOME | End: 2020-07-05
Attending: EMERGENCY MEDICINE
Payer: COMMERCIAL

## 2020-07-05 VITALS
OXYGEN SATURATION: 95 % | TEMPERATURE: 98 F | RESPIRATION RATE: 20 BRPM | DIASTOLIC BLOOD PRESSURE: 81 MMHG | WEIGHT: 179.9 LBS | HEART RATE: 84 BPM | HEIGHT: 65 IN | SYSTOLIC BLOOD PRESSURE: 130 MMHG

## 2020-07-05 PROCEDURE — 99284 EMERGENCY DEPT VISIT MOD MDM: CPT

## 2020-07-05 PROCEDURE — 73140 X-RAY EXAM OF FINGER(S): CPT | Mod: 26,RT

## 2020-07-05 PROCEDURE — 12001 RPR S/N/AX/GEN/TRNK 2.5CM/<: CPT

## 2020-07-05 PROCEDURE — 99284 EMERGENCY DEPT VISIT MOD MDM: CPT | Mod: 25

## 2020-07-05 RX ORDER — CEPHALEXIN 500 MG
1 CAPSULE ORAL
Qty: 28 | Refills: 0
Start: 2020-07-05 | End: 2020-07-11

## 2020-07-05 RX ORDER — IBUPROFEN 200 MG
600 TABLET ORAL ONCE
Refills: 0 | Status: COMPLETED | OUTPATIENT
Start: 2020-07-05 | End: 2020-07-05

## 2020-07-05 RX ADMIN — Medication 600 MILLIGRAM(S): at 13:57

## 2020-07-05 NOTE — ED ADULT TRIAGE NOTE - CHIEF COMPLAINT QUOTE
Pt states he injured left 2nd digit while at work yesterday.  No lac or open wounds as per pt, pt has a dressing in place. Pt states he injured right 4th digit while at work yesterday.  No lac or open wounds as per pt, pt has a dressing in place applied by urgent care.

## 2020-07-05 NOTE — ED PROCEDURE NOTE - CPROC ED POST PROC CARE GUIDE1
Instructed patient/caregiver regarding signs and symptoms of infection./Elevate the injured extremity as instructed./Keep the cast/splint/dressing clean and dry./Instructed patient/caregiver to follow-up with primary care physician./Verbal/written post procedure instructions were given to patient/caregiver.
Keep the cast/splint/dressing clean and dry./Instructed patient/caregiver regarding signs and symptoms of infection./Instructed patient/caregiver to follow-up with primary care physician./Verbal/written post procedure instructions were given to patient/caregiver.

## 2020-07-05 NOTE — ED PROVIDER NOTE - PROGRESS NOTE DETAILS
DAI Tripp NOTE: Per Hand recommendations will tack down nail, splint and follow up Hand surgeon. Reviewed all results and plan with Dr. Cherry. Pt stable for d/c, reports improvement, VSS, tolerating PO, ambulatory.  Discussion includes results, plan, proper medication use/side effects, and return precautions. Pt advised to f/u with PMD 1-2 days and specialists discussed.  Pt given printed copies of lab/radiology for outpt follow up. Pt verbalized understanding/agreement of plan. ED  Layne

## 2020-07-05 NOTE — ED PROVIDER NOTE - NSFOLLOWUPINSTRUCTIONS_ED_ALL_ED_FT
- Please follow up with your Primary Care Doctor in 24-48 hr.  - Seek immediate medical care for any new, worsening or concerning signs or symptoms.   - Take medications as directed, be sure to read all instructions on packaging, Be sure to complete entire course of antibiotics as directed   - Follow up with Hand surgeon, listed above, in 1 week    Feel better!   -  Laceration    A laceration is a cut that goes through all of the layers of the skin and into the tissue that is right under the skin. Some lacerations heal on their own. Others need to be closed with skin adhesive strips, skin glue, stitches (sutures), or staples. Proper laceration care minimizes the risk of infection and helps the laceration to heal better.  If non-absorbable stitches or staples have been placed, they must be taken out within the time frame instructed by your healthcare provider.    SEEK IMMEDIATE MEDICAL CARE IF YOU HAVE ANY OF THE FOLLOWING SYMPTOMS: swelling around the wound, worsening pain, drainage from the wound, red streaking going away from your wound, inability to move finger or toe near the laceration, or discoloration of skin near the laceration.

## 2020-07-05 NOTE — ED PROCEDURE NOTE - CPROC ED TIME OUT STATEMENT1
“Patient's name, , procedure and correct site were confirmed during the [place] Timeout.”
“Patient's name, , procedure and correct site were confirmed during the [place] Timeout.”

## 2020-07-05 NOTE — CONSULT NOTE ADULT - SUBJECTIVE AND OBJECTIVE BOX
Pt Name: HE VALENZUELA    MRN: [id number]      · Chief Complaint: The patient is a 54y Male complaining of finger pain/injury.  · HPI Objective Statement: 55 y/o M with no PMHx presents to ED c/o right 4th digit pain after injury at work this morning. Pt reports he was using a mixed when he accidentally stuck his finger into mixer while it was on and it hit his right 4th digit.  he went to urgent care and was sent to ER.         HEALTH ISSUES - PROBLEM Dx:  none    REVIEW OF SYSTEMS      General:	no fever    Skin/Breast: laceration, nail injury to rigth 4th     Neurological:	no numbness/tingling    ROS is otherwise negative.    PAST MEDICAL & SURGICAL HISTORY:  No pertinent past medical history      Allergies: penicillin (Unknown)    FAMILY HISTORY:  : non-contributory    Social History:     Ambulation: Walking independently [ x] With Cane [ ] With Walker [ ]  Bedbound [ ]               PHYSICAL EXAM:    Vital Signs Last 24 Hrs  T(C): 36.7 (05 Jul 2020 12:36), Max: 36.7 (05 Jul 2020 12:36)  T(F): 98 (05 Jul 2020 12:36), Max: 98 (05 Jul 2020 12:36)  HR: 84 (05 Jul 2020 12:36) (84 - 84)  BP: 130/81 (05 Jul 2020 12:36) (130/81 - 130/81)  BP(mean): --  RR: 20 (05 Jul 2020 12:36) (20 - 20)  SpO2: 95% (05 Jul 2020 12:36) (95% - 95%)  Daily Height in cm: 165.1 (05 Jul 2020 12:36)    Daily     pt sitting in stretcher NAD  right 4th digit with partial avulsion of nail proximally  superficial laceration to ulna side of nail with extension to pad  no soft tissue/bone exposure  finger movement intact  sensation to digit intact    Imaging Studies: EXAM:  FINGER(S) RIGHT HAND                          PROCEDURE DATE:  07/05/2020          INTERPRETATION:  Right fourth finger. Patient had local laceration. 3 views.    There is a small horizontal fracture of the distal tip.    IMPRESSION: Small distal tip fracture.      A/P:  Pt is a  54y Male with partial avulsion of nail small 4th distal phalanx fx    PLAN:   Recommend  Irrigate  tack nail down   pain control  keflex 500 qid x 1 week  follow up with Dr. Washington in 3-5 days    Discussed with Dr. Washington Pt Name: HE VALENZUELA    MRN: [id number]      · Chief Complaint: The patient is a 54y Male complaining of finger pain/injury.  · HPI Objective Statement: 55 y/o M with no PMHx presents to ED c/o right 4th digit pain after injury at work this morning. Pt reports he was using a mixed when he accidentally stuck his finger into mixer while it was on and it hit his right 4th digit.  he went to urgent care and was sent to ER.         HEALTH ISSUES - PROBLEM Dx:  none    REVIEW OF SYSTEMS      General:	no fever    Skin/Breast: laceration, nail injury to rigth 4th     Neurological:	no numbness/tingling    ROS is otherwise negative.    PAST MEDICAL & SURGICAL HISTORY:  No pertinent past medical history      Allergies: penicillin (Unknown)    FAMILY HISTORY:  : non-contributory    Social History:     Ambulation: Walking independently [ x] With Cane [ ] With Walker [ ]  Bedbound [ ]               PHYSICAL EXAM:    Vital Signs Last 24 Hrs  T(C): 36.7 (05 Jul 2020 12:36), Max: 36.7 (05 Jul 2020 12:36)  T(F): 98 (05 Jul 2020 12:36), Max: 98 (05 Jul 2020 12:36)  HR: 84 (05 Jul 2020 12:36) (84 - 84)  BP: 130/81 (05 Jul 2020 12:36) (130/81 - 130/81)  BP(mean): --  RR: 20 (05 Jul 2020 12:36) (20 - 20)  SpO2: 95% (05 Jul 2020 12:36) (95% - 95%)  Daily Height in cm: 165.1 (05 Jul 2020 12:36)    Daily     pt sitting in stretcher NAD  right 4th digit with partial avulsion of nail proximally  superficial laceration to ulna side of nail with extension to pad  no soft tissue/bone exposure  finger movement intact  sensation to digit intact    Imaging Studies: EXAM:  FINGER(S) RIGHT HAND                          PROCEDURE DATE:  07/05/2020          INTERPRETATION:  Right fourth finger. Patient had local laceration. 3 views.    There is a small horizontal fracture of the distal tip.    IMPRESSION: Small distal tip fracture.      A/P:  Pt is a  54y Male with partial avulsion of nail small 4th distal phalanx fx    PLAN:   Recommend  Irrigate  tack nail down   finger splint  pain control  keflex 500 qid x 1 week  follow up with Dr. Washington in 3-5 days    Discussed with Dr. Washington

## 2020-07-05 NOTE — ED PROVIDER NOTE - OBJECTIVE STATEMENT
55 y/o M with no PMHx presents to ED c/o right 4th digit pain after injury at work this morning. Pt reports he was using a mixed when he accidentally stuck his finger into mixer while it was on and it hit his right 4th digit. Pt reports he went to urgent care and was sent to ER. Pt reports last known TDAP was 2 years ago.   Pt does not take blood thinners. 55 y/o M with no PMHx presents to ED c/o right 4th digit pain after injury at work this morning. Pt reports he was using a mixer when he accidentally stuck his finger into mixer while it was on and it hit his right 4th digit. Pt reports he went to urgent care and was sent to ER. Pt reports last known TDAP was 2 years ago.   Pt does not take blood thinners. Pt denies allergy to penicillin.

## 2020-07-05 NOTE — ED PROCEDURE NOTE - CPROC ED INFORMED CONSENT1
Benefits, risks, and possible complications of procedure explained to patient/caregiver who verbalized understanding and gave verbal consent./ED  Layne
ED  Layne/Benefits, risks, and possible complications of procedure explained to patient/caregiver who verbalized understanding and gave verbal consent.

## 2020-07-05 NOTE — ED PROVIDER NOTE - CARE PLAN
Principal Discharge DX:	Avulsion of fingernail, initial encounter  Secondary Diagnosis:	Finger fracture, right

## 2020-07-05 NOTE — ED PROVIDER NOTE - CLINICAL SUMMARY MEDICAL DECISION MAKING FREE TEXT BOX
53 y/o M with no PMHx presents to ED c/o right 4th digit partial nail avulsion/laceration after getting finger caught today at work. NVI without active bleeding  -Will check XR, analgesics, Hand consultation   -Will follow up and re-evaluate patient

## 2020-07-05 NOTE — ED PROVIDER NOTE - ATTENDING CONTRIBUTION TO CARE
Pt. with laceration to Right 4th digit(distal phalanx). Laceration across nailbed. +Skin avulsion also to distal phalanx. Pt. seen by ortho/hand. I, Dr. Cherry, performed a face to face bedside interview with this patient regarding history of present illness, review of symptoms and relevant past medical, social and family history.  I completed an independent physical examination.  I have also reviewed the ACP's note(s) and discussed the plan with the ACP.

## 2020-07-05 NOTE — ED PROCEDURE NOTE - CPROC ED LACER REPAIR DETAIL1
No foreign body/The wound was explored to base in bloodless field. The procedure was performed independently

## 2020-07-05 NOTE — ED PROVIDER NOTE - PATIENT PORTAL LINK FT
You can access the FollowMyHealth Patient Portal offered by [facility] by registering at the following website: [email or web address]. By joining [employer]’s FollowMyHealth portal, you will also be able to view your health information using other applications (apps) compatible with our system.

## 2020-07-05 NOTE — ED PROVIDER NOTE - CARE PROVIDER_API CALL
Han Washington  PLASTIC SURGERY  [address] [place] [address], [address]  [place] [address]  Phone: [phone]  Fax: [phone]  Follow Up Time: 4-6 Days

## 2020-07-05 NOTE — ED PROVIDER NOTE - PHYSICAL EXAMINATION
Vital signs noted, see flowsheet.  General: NAD, well appearing and non-toxic.  HEENT: NC/AT. MMM.  Neck: Soft and supple, full ROM without pain.  Cardiac: RRR. +S1/S2. Peripheral pulses 2+ and symmetric b/l.   Respiratory: Lungs CTA  Neuro: Awake, alert and oriented to person/place/time/situation  Right Upper Extremity: 4th digit proximally with partial avulsion of nail, overlying superficial laceration. Sensation intact. +FROM of digit, full flexion and extension. No active bleeding. No other injuries to extremity

## 2020-07-06 PROCEDURE — 73140 X-RAY EXAM OF FINGER(S): CPT

## 2020-07-06 PROCEDURE — 12001 RPR S/N/AX/GEN/TRNK 2.5CM/<: CPT | Mod: F8

## 2020-07-06 PROCEDURE — T1013: CPT

## 2020-07-06 PROCEDURE — 99284 EMERGENCY DEPT VISIT MOD MDM: CPT | Mod: 25

## 2020-07-13 ENCOUNTER — APPOINTMENT (OUTPATIENT)
Dept: ORTHOPEDIC SURGERY | Facility: CLINIC | Age: 55
End: 2020-07-13
Payer: OTHER MISCELLANEOUS

## 2020-07-14 ENCOUNTER — APPOINTMENT (OUTPATIENT)
Dept: ORTHOPEDIC SURGERY | Facility: CLINIC | Age: 55
End: 2020-07-14
Payer: OTHER MISCELLANEOUS

## 2020-07-14 VITALS
WEIGHT: 180 LBS | DIASTOLIC BLOOD PRESSURE: 74 MMHG | HEART RATE: 72 BPM | HEIGHT: 64 IN | SYSTOLIC BLOOD PRESSURE: 116 MMHG | BODY MASS INDEX: 30.73 KG/M2

## 2020-07-14 VITALS — TEMPERATURE: 98.3 F

## 2020-07-14 PROCEDURE — 26750 TREAT FINGER FRACTURE EACH: CPT | Mod: F8

## 2020-07-14 PROCEDURE — 99204 OFFICE O/P NEW MOD 45 MIN: CPT | Mod: 57

## 2020-07-14 NOTE — DISCUSSION/SUMMARY
[de-identified] : 55-year-old male status post right ring finger laceration and distal phalanx fracture.  He appears to be doing well.  We would recommend some occupational therapy to work on range of motion.  We discussed that the nail repair will likely cause the nail to fall off but hopefully a new nail will grow underneath.  As the new nail grows, he should clean under the leading edge to prevent the nail from diving into the nail bed.  As long as he continues to do okay, he may follow-up PRN.  He may need some time off of work depending on his workplace responsibilities and I would be happy to give him a few weeks if needed.  However he is allowed to return to work when he feels comfortable.  The patient was given the opportunity to ask questions and all questions were answered to their satisfaction.\par \par Arnaud Moran MD\par Orthopaedic Trauma Surgeon\par [facility]\par Director Orthopaedic Trauma, [facility]\par \par \par \par

## 2020-07-14 NOTE — PHYSICAL EXAM
[de-identified] : Physical Exam:\par General: Well appearing, no acute distress, A&O\par Neurologic: A&Ox3, No focal deficits\par Head: NCAT without abrasions, lacerations, or ecchymosis to head, face, or scalp\par Respiratory: Equal chest wall expansion bilaterally, no accessory muscle use\par Lymphatic: No lymphadenopathy palpated\par Skin: Warm and dry\par Psychiatric: Normal mood and affect\par \par Right Hand\par SILT r/m/u\par Fires AIN/PIN/ulnar\par 2+ radial pulse\par brisk capillary refill\par Sutures in place and nail bed.  Positive ecchymosis, positive swelling\par Digital nerves apparently intact [de-identified] : No new imaging today but films from the emergency department show a distal phalanx fracture and soft tissue injury consistent with the clinical exam today.

## 2020-07-14 NOTE — HISTORY OF PRESENT ILLNESS
[de-identified] : The patient is a pleasant 55 year old male who presents today for evaluation of a right ring finger injury.  He was at work when he suffered an injury to the ring finger.  He works in a bakery.  He was seen in the ER.  He was noted to have a distal phalanx fracture as well as a nailbed injury.  He had a nailbed repair and the nail was sewn back in place with absorbable sutures.  He comes in today for evaluation.  He feels that he is doing much better.  He has near full range of motion already but does still have some pain and swelling of the finger.  The patient states the pain is made worse with activity and relieved with rest. aching, 3/10 [Bending] : worsened by bending [Weight Bearing] : worsened by weight bearing [Lifting] : worsened by lifting [Recumbency] : relieved by recumbency [Rest] : relieved by rest

## 2020-08-02 ENCOUNTER — LABORATORY RESULT (OUTPATIENT)
Age: 55
End: 2020-08-02

## 2020-08-02 ENCOUNTER — APPOINTMENT (OUTPATIENT)
Dept: DISASTER EMERGENCY | Facility: CLINIC | Age: 55
End: 2020-08-02

## 2020-08-03 LAB — SARS-COV-2 N GENE NPH QL NAA+PROBE: NOT DETECTED

## 2020-08-05 ENCOUNTER — APPOINTMENT (OUTPATIENT)
Dept: GASTROENTEROLOGY | Facility: GI CENTER | Age: 55
End: 2020-08-05
Payer: COMMERCIAL

## 2020-08-05 ENCOUNTER — RESULT REVIEW (OUTPATIENT)
Age: 55
End: 2020-08-05

## 2020-08-05 ENCOUNTER — OUTPATIENT (OUTPATIENT)
Dept: OUTPATIENT SERVICES | Facility: HOSPITAL | Age: 55
LOS: 1 days | End: 2020-08-05
Payer: COMMERCIAL

## 2020-08-05 DIAGNOSIS — K64.4 RESIDUAL HEMORRHOIDAL SKIN TAGS: ICD-10-CM

## 2020-08-05 DIAGNOSIS — R14.0 ABDOMINAL DISTENSION (GASEOUS): ICD-10-CM

## 2020-08-05 DIAGNOSIS — K63.5 POLYP OF COLON: ICD-10-CM

## 2020-08-05 DIAGNOSIS — Z12.11 ENCOUNTER FOR SCREENING FOR MALIGNANT NEOPLASM OF COLON: ICD-10-CM

## 2020-08-05 PROCEDURE — 45385 COLONOSCOPY W/LESION REMOVAL: CPT

## 2020-08-05 PROCEDURE — 88305 TISSUE EXAM BY PATHOLOGIST: CPT

## 2020-08-05 PROCEDURE — 45385 COLONOSCOPY W/LESION REMOVAL: CPT | Mod: PT

## 2020-08-05 PROCEDURE — 88305 TISSUE EXAM BY PATHOLOGIST: CPT | Mod: 26

## 2020-08-05 RX ORDER — SODIUM SULFATE, POTASSIUM SULFATE, MAGNESIUM SULFATE 17.5; 3.13; 1.6 G/ML; G/ML; G/ML
17.5-3.13-1.6 SOLUTION, CONCENTRATE ORAL
Qty: 2 | Refills: 0 | Status: COMPLETED | COMMUNITY
Start: 2020-07-10 | End: 2020-08-05

## 2020-08-05 NOTE — PROCEDURE
[With Snare Polypectomy] : snare polypectomy [Colon Cancer Screening] : colon cancer screening [Procedure Explained] : The procedure was explained [Allergies Reviewed] : allergies reviewed. [Risks] : Risks [Benefits] : benefits [Alternatives] : alternatives [Bleeding] : bleeding risk [Infection] : risk of infection [Consent Obtained] : written consent was obtained prior to the procedure and is detailed in the patient's record [Patient] : the patient [Bowel Prep Kit] : the patient took the appropriate bowel preparation kit as directed [Approved Diet Followed] : the patient avoided solid foods and adhered to the approved diet list for 24 hours prior to the procedure [Cardiac Monitor] : cardiac monitor [Automated Blood Pressure Cuff] : automated blood pressure cuff [Pulse Oximeter] : pulse oximeter [Propofol ___ mg IV] : Propofol [unfilled] ~Umg intravenously [2] : 2 [Sedation Clearance] : the patient was cleared for moderate sedation [Prep Qualtiy: ___] : Prep Quality:  [unfilled] [Withdrawal Time: ___] : Withdrawal Time:  [unfilled] [Performed By: ___] : Performed by:  ANGELA [Left Lateral Decubitus] : The patient was positioned in the left lateral decubitus position [Abnormal Rectum] : a normal rectum [Normal Prostate] : a normal prostate [Cecum (Landmarks)] : and guided to the cecum which was identified by the anatomic landmarks of the appendiceal orifice and ileocecal valve [Minimal Difficulty] : with minimal difficulty [Insufflated] : insufflated [Single Pass Needed] : after a single pass [Retroflex View] : a retroflex view of the rectum was performed [Patient Rotated Into Alternating Positions] : the patient was not rotated [Normal] : Normal [Diverticulosis] : diverticulosis [Polyps] : polyps [Cold Snare Polypectomy] : cold snare polypectomy [Hemorrhoids] : hemorrhoids [Sent to Pathology] : was sent to pathology for analysis [Tolerated Well] : the patient tolerated the procedure well [Vital Signs Stable] : the vital signs were stable [No Complications] : There were no complications [Pain] : the patient complained of significant pain [FreeTextEntry2] : [employer] [id number]

## 2020-08-05 NOTE — ASSESSMENT
[FreeTextEntry1] : Scattered diverticula seen throughout the colon.  4 mm sessile polyp in the sigmoid colon, resected via cold snare.  External hemorrhoids seen on retroflexed view.\par \par Follow up pathology\par Repeat colonoscopy in 5 years.

## 2020-08-05 NOTE — PROCEDURE
[With Snare Polypectomy] : snare polypectomy [Colon Cancer Screening] : colon cancer screening [Procedure Explained] : The procedure was explained [Allergies Reviewed] : allergies reviewed. [Risks] : Risks [Benefits] : benefits [Alternatives] : alternatives [Bleeding] : bleeding risk [Infection] : risk of infection [Consent Obtained] : written consent was obtained prior to the procedure and is detailed in the patient's record [Patient] : the patient [Bowel Prep Kit] : the patient took the appropriate bowel preparation kit as directed [Approved Diet Followed] : the patient avoided solid foods and adhered to the approved diet list for 24 hours prior to the procedure [Automated Blood Pressure Cuff] : automated blood pressure cuff [Cardiac Monitor] : cardiac monitor [Pulse Oximeter] : pulse oximeter [Propofol ___ mg IV] : Propofol [unfilled] ~Umg intravenously [2] : 2 [Sedation Clearance] : the patient was cleared for moderate sedation [Prep Qualtiy: ___] : Prep Quality:  [unfilled] [Withdrawal Time: ___] : Withdrawal Time:  [unfilled] [Performed By: ___] : Performed by:  ANGELA [Left Lateral Decubitus] : The patient was positioned in the left lateral decubitus position [Abnormal Rectum] : a normal rectum [Normal Prostate] : a normal prostate [Cecum (Landmarks)] : and guided to the cecum which was identified by the anatomic landmarks of the appendiceal orifice and ileocecal valve [Minimal Difficulty] : with minimal difficulty [Insufflated] : insufflated [Single Pass Needed] : after a single pass [Retroflex View] : a retroflex view of the rectum was performed [Patient Rotated Into Alternating Positions] : the patient was not rotated [Normal] : Normal [Diverticulosis] : diverticulosis [Polyps] : polyps [Cold Snare Polypectomy] : cold snare polypectomy [Hemorrhoids] : hemorrhoids [Sent to Pathology] : was sent to pathology for analysis [Tolerated Well] : the patient tolerated the procedure well [Vital Signs Stable] : the vital signs were stable [No Complications] : There were no complications [Pain] : the patient complained of significant pain [FreeTextEntry2] : [employer] [id number]

## 2020-08-05 NOTE — PHYSICAL EXAM
[General Appearance - In No Acute Distress] : in no acute distress [General Appearance - Alert] : alert [PERRL With Normal Accommodation] : pupils were equal in size, round, and reactive to light [Sclera] : the sclera and conjunctiva were normal [Extraocular Movements] : extraocular movements were intact [Neck Appearance] : the appearance of the neck was normal [Oropharynx] : the oropharynx was normal [Outer Ear] : the ears and nose were normal in appearance [Neck Cervical Mass (___cm)] : no neck mass was observed [Jugular Venous Distention Increased] : there was no jugular-venous distention [Thyroid Diffuse Enlargement] : the thyroid was not enlarged [Thyroid Nodule] : there were no palpable thyroid nodules [Heart Rate And Rhythm] : heart rate was normal and rhythm regular [Heart Sounds] : normal S1 and S2 [Auscultation Breath Sounds / Voice Sounds] : lungs were clear to auscultation bilaterally [Heart Sounds Pericardial Friction Rub] : no pericardial rub [Heart Sounds Gallop] : no gallops [Murmurs] : no murmurs [Bowel Sounds] : normal bowel sounds [Abdomen Soft] : soft [Edema] : there was no peripheral edema [] : no hepato-splenomegaly [Abdomen Tenderness] : non-tender [Abdomen Mass (___ Cm)] : no abdominal mass palpated [Cervical Lymph Nodes Enlarged Posterior Bilaterally] : posterior cervical [Cervical Lymph Nodes Enlarged Anterior Bilaterally] : anterior cervical [Supraclavicular Lymph Nodes Enlarged Bilaterally] : supraclavicular [Nail Clubbing] : no clubbing  or cyanosis of the fingernails [Skin Color & Pigmentation] : normal skin color and pigmentation [No Focal Deficits] : no focal deficits [Skin Turgor] : normal skin turgor [Affect] : the affect was normal [Impaired Insight] : insight and judgment were intact [Oriented To Time, Place, And Person] : oriented to person, place, and time

## 2020-08-05 NOTE — PHYSICAL EXAM
[General Appearance - In No Acute Distress] : in no acute distress [General Appearance - Alert] : alert [Extraocular Movements] : extraocular movements were intact [PERRL With Normal Accommodation] : pupils were equal in size, round, and reactive to light [Sclera] : the sclera and conjunctiva were normal [Oropharynx] : the oropharynx was normal [Neck Appearance] : the appearance of the neck was normal [Outer Ear] : the ears and nose were normal in appearance [Neck Cervical Mass (___cm)] : no neck mass was observed [Jugular Venous Distention Increased] : there was no jugular-venous distention [Thyroid Diffuse Enlargement] : the thyroid was not enlarged [Thyroid Nodule] : there were no palpable thyroid nodules [Heart Sounds] : normal S1 and S2 [Auscultation Breath Sounds / Voice Sounds] : lungs were clear to auscultation bilaterally [Heart Rate And Rhythm] : heart rate was normal and rhythm regular [Murmurs] : no murmurs [Heart Sounds Pericardial Friction Rub] : no pericardial rub [Heart Sounds Gallop] : no gallops [Bowel Sounds] : normal bowel sounds [Abdomen Soft] : soft [Edema] : there was no peripheral edema [Abdomen Tenderness] : non-tender [Abdomen Mass (___ Cm)] : no abdominal mass palpated [] : no hepato-splenomegaly [Cervical Lymph Nodes Enlarged Anterior Bilaterally] : anterior cervical [Supraclavicular Lymph Nodes Enlarged Bilaterally] : supraclavicular [Cervical Lymph Nodes Enlarged Posterior Bilaterally] : posterior cervical [Skin Color & Pigmentation] : normal skin color and pigmentation [Nail Clubbing] : no clubbing  or cyanosis of the fingernails [No Focal Deficits] : no focal deficits [Skin Turgor] : normal skin turgor [Oriented To Time, Place, And Person] : oriented to person, place, and time [Impaired Insight] : insight and judgment were intact [Affect] : the affect was normal

## 2020-08-07 LAB — SURGICAL PATHOLOGY STUDY: SIGNIFICANT CHANGE UP

## 2020-08-21 ENCOUNTER — RX RENEWAL (OUTPATIENT)
Age: 55
End: 2020-08-21

## 2020-08-25 ENCOUNTER — RX RENEWAL (OUTPATIENT)
Age: 55
End: 2020-08-25

## 2020-08-27 ENCOUNTER — APPOINTMENT (OUTPATIENT)
Dept: INTERNAL MEDICINE | Facility: CLINIC | Age: 55
End: 2020-08-27

## 2020-09-30 ENCOUNTER — RX RENEWAL (OUTPATIENT)
Age: 55
End: 2020-09-30

## 2020-11-18 ENCOUNTER — APPOINTMENT (OUTPATIENT)
Dept: INTERNAL MEDICINE | Facility: CLINIC | Age: 55
End: 2020-11-18
Payer: COMMERCIAL

## 2020-11-18 ENCOUNTER — NON-APPOINTMENT (OUTPATIENT)
Age: 55
End: 2020-11-18

## 2020-11-18 VITALS
OXYGEN SATURATION: 98 % | TEMPERATURE: 98.5 F | BODY MASS INDEX: 31.58 KG/M2 | RESPIRATION RATE: 15 BRPM | HEIGHT: 64 IN | SYSTOLIC BLOOD PRESSURE: 110 MMHG | DIASTOLIC BLOOD PRESSURE: 72 MMHG | WEIGHT: 185 LBS | HEART RATE: 70 BPM

## 2020-11-18 DIAGNOSIS — H57.89 OTHER SPECIFIED DISORDERS OF EYE AND ADNEXA: ICD-10-CM

## 2020-11-18 DIAGNOSIS — Z11.4 ENCOUNTER FOR SCREENING FOR HUMAN IMMUNODEFICIENCY VIRUS [HIV]: ICD-10-CM

## 2020-11-18 DIAGNOSIS — D12.6 BENIGN NEOPLASM OF COLON, UNSPECIFIED: ICD-10-CM

## 2020-11-18 DIAGNOSIS — U07.1 COVID-19: ICD-10-CM

## 2020-11-18 PROCEDURE — 93000 ELECTROCARDIOGRAM COMPLETE: CPT

## 2020-11-18 PROCEDURE — G0008: CPT

## 2020-11-18 PROCEDURE — 90686 IIV4 VACC NO PRSV 0.5 ML IM: CPT

## 2020-11-18 PROCEDURE — 96127 BRIEF EMOTIONAL/BEHAV ASSMT: CPT

## 2020-11-18 PROCEDURE — 99214 OFFICE O/P EST MOD 30 MIN: CPT | Mod: 25

## 2020-11-18 RX ORDER — ATORVASTATIN CALCIUM 10 MG/1
10 TABLET, FILM COATED ORAL DAILY
Qty: 90 | Refills: 1 | Status: DISCONTINUED | COMMUNITY
Start: 2020-02-25 | End: 2020-11-18

## 2020-11-18 NOTE — PHYSICAL EXAM
[No Acute Distress] : no acute distress [Well-Appearing] : well-appearing [Normal Voice/Communication] : normal voice/communication [Normal Sclera/Conjunctiva] : normal sclera/conjunctiva [PERRL] : pupils equal round and reactive to light [Normal Oropharynx] : the oropharynx was normal [No JVD] : no jugular venous distention [No Lymphadenopathy] : no lymphadenopathy [Supple] : supple [Thyroid Normal, No Nodules] : the thyroid was normal and there were no nodules present [No Respiratory Distress] : no respiratory distress  [No Accessory Muscle Use] : no accessory muscle use [Clear to Auscultation] : lungs were clear to auscultation bilaterally [Normal Rate] : normal rate  [Regular Rhythm] : with a regular rhythm [Normal S1, S2] : normal S1 and S2 [No Murmur] : no murmur heard [No Edema] : there was no peripheral edema [Soft] : abdomen soft [Non Tender] : non-tender [Non-distended] : non-distended [No Masses] : no abdominal mass palpated [No HSM] : no HSM [Normal Bowel Sounds] : normal bowel sounds [No Spinal Tenderness] : no spinal tenderness [No Rash] : no rash [No Focal Deficits] : no focal deficits [Normal Affect] : the affect was normal [Alert and Oriented x3] : oriented to person, place, and time [Normal Mood] : the mood was normal [Normal Insight/Judgement] : insight and judgment were intact [de-identified] : nasal mucosa is is mildly irritated along septum B/L with some mild crusting

## 2020-11-18 NOTE — ASSESSMENT
[FreeTextEntry1] : \par \par B/L arm and leg pains:\par -Symptoms have resolved\par \par Eyes feels like they are burning and tired:\par -No further symptoms\par -He states he was seen by ophthalmology and glasses advised\par \par GERD:\par -pantoprazole prn\par \par DM: last HgA1c 6.7 2020\par -on no meds\par -A:C negative 2020\par -he self d/c atorvastatin\par -Foot exam 2020\par -ophthalmology: 10/2020 normal exam per pt\par -Fasting labs ordered he will have done her lab\par \par Dyslipidemia\par -he self d/c atorvastatin\par -I adv low fat/low chol diet and weight loss\par -will likely benefit from statin given DM\par -we'll check fasting labs\par \par Vitamin D Deficiency:\par -will check vitamin D 25-OH\par \par Nasal irritation/crusting/impetigo\par -will treat with Bactroban ointment to affected area twice daily for 7 days\par \par Obesity:\par -BMI 31\par -risks of obesity discussed\par -benefits of weight loss discussed\par -low fat/low chol diet and low carbohydrate diet advised\par -small portion sizes encouraged\par -I advised avoidance of sugary drinks\par -aerobic exercise encouraged\par -weight loss advised\par \par \par \par HCM:\par \par CPE: 2019\par \par EK20- normal sinus rhythm at 74, no ST abnormalities\par \par Influenza vaccine: advised.  R/B discussed.  No egg allergy reported.  VIS given.  Flu shot given today 2020\par \par Tdap: 2014\par \par Pneumovax:  10/2/2019\par \par HIV test: 2018 negative-offered 2020 he consented to testing\par \par Hepatitis C screenin2016\par \par Depression screenin2020-PHQ 2 score 0 negative\par \par Colonoscopy: 2020 tubular adenoma-repeat 5 years advised\par \par FIT testin2018 negative\par \par PSA 0.6 10/2019\par \par Covid 19 antibody: positive 2020\par \par Covid PCR test-he plans on flying to [facility] 2021 and will need covid test-will order\par \par \par F/U 3 months.  he will have fasting labs done at the lab

## 2020-11-18 NOTE — HISTORY OF PRESENT ILLNESS
[de-identified] : Here for follow up\par \par He states he feels fine\par \par He reports he is no longer taking atorvastatin\par \par He states he has had a hard time losing weight\par \par He states he still gets occasional inner nasal irritation and crusting.  He states in the past he was prescribed an antibiotic cream which did help

## 2020-11-18 NOTE — REVIEW OF SYSTEMS
[Negative] : Musculoskeletal [Fever] : no fever [Chills] : no chills [Fatigue] : no fatigue [Earache] : no earache [Nasal Discharge] : no nasal discharge [Sore Throat] : no sore throat [Chest Pain] : no chest pain [Palpitations] : no palpitations [Lower Ext Edema] : no lower extremity edema [Shortness Of Breath] : no shortness of breath [Wheezing] : no wheezing [Cough] : no cough [Dyspnea on Exertion] : not dyspnea on exertion [Abdominal Pain] : no abdominal pain [Nausea] : no nausea [Diarrhea] : no diarrhea [Vomiting] : no vomiting [Anxiety] : no anxiety [Depression] : no depression [FreeTextEntry4] : See history of present illness

## 2020-11-23 ENCOUNTER — APPOINTMENT (OUTPATIENT)
Dept: INTERNAL MEDICINE | Facility: CLINIC | Age: 55
End: 2020-11-23

## 2020-11-30 ENCOUNTER — NON-APPOINTMENT (OUTPATIENT)
Age: 55
End: 2020-11-30

## 2021-03-03 ENCOUNTER — APPOINTMENT (OUTPATIENT)
Dept: INTERNAL MEDICINE | Facility: CLINIC | Age: 56
End: 2021-03-03
Payer: COMMERCIAL

## 2021-03-03 ENCOUNTER — NON-APPOINTMENT (OUTPATIENT)
Age: 56
End: 2021-03-03

## 2021-03-03 VITALS
HEIGHT: 64 IN | HEART RATE: 78 BPM | OXYGEN SATURATION: 97 % | DIASTOLIC BLOOD PRESSURE: 80 MMHG | WEIGHT: 184 LBS | TEMPERATURE: 98.3 F | RESPIRATION RATE: 14 BRPM | BODY MASS INDEX: 31.41 KG/M2 | SYSTOLIC BLOOD PRESSURE: 128 MMHG

## 2021-03-03 DIAGNOSIS — Z91.11 PATIENT'S NONCOMPLIANCE WITH DIETARY REGIMEN: ICD-10-CM

## 2021-03-03 DIAGNOSIS — Z78.9 OTHER SPECIFIED HEALTH STATUS: ICD-10-CM

## 2021-03-03 DIAGNOSIS — L98.9 DISORDER OF THE SKIN AND SUBCUTANEOUS TISSUE, UNSPECIFIED: ICD-10-CM

## 2021-03-03 DIAGNOSIS — L85.3 XEROSIS CUTIS: ICD-10-CM

## 2021-03-03 DIAGNOSIS — J34.89 OTHER SPECIFIED DISORDERS OF NOSE AND NASAL SINUSES: ICD-10-CM

## 2021-03-03 DIAGNOSIS — M79.602 PAIN IN RIGHT ARM: ICD-10-CM

## 2021-03-03 DIAGNOSIS — Z91.14 PATIENT'S NONCOMPLIANCE WITH DIETARY REGIMEN: ICD-10-CM

## 2021-03-03 DIAGNOSIS — M79.601 PAIN IN RIGHT ARM: ICD-10-CM

## 2021-03-03 DIAGNOSIS — M79.605 PAIN IN RIGHT LEG: ICD-10-CM

## 2021-03-03 DIAGNOSIS — S62.634A DISPLACED FRACTURE OF DISTAL PHALANX OF RIGHT RING FINGER, INITIAL ENCOUNTER FOR CLOSED FRACTURE: ICD-10-CM

## 2021-03-03 DIAGNOSIS — Z12.11 ENCOUNTER FOR SCREENING FOR MALIGNANT NEOPLASM OF COLON: ICD-10-CM

## 2021-03-03 DIAGNOSIS — M79.604 PAIN IN RIGHT LEG: ICD-10-CM

## 2021-03-03 DIAGNOSIS — Z01.818 ENCOUNTER FOR OTHER PREPROCEDURAL EXAMINATION: ICD-10-CM

## 2021-03-03 DIAGNOSIS — Z92.29 PERSONAL HISTORY OF OTHER DRUG THERAPY: ICD-10-CM

## 2021-03-03 PROCEDURE — 99072 ADDL SUPL MATRL&STAF TM PHE: CPT

## 2021-03-03 PROCEDURE — 96127 BRIEF EMOTIONAL/BEHAV ASSMT: CPT

## 2021-03-03 PROCEDURE — 99396 PREV VISIT EST AGE 40-64: CPT | Mod: 25

## 2021-03-07 PROBLEM — Z91.11 NONCOMPLIANCE WITH DIET AND MEDICATION REGIMEN: Status: RESOLVED | Noted: 2019-12-02 | Resolved: 2021-03-07

## 2021-03-07 PROBLEM — J34.89 NASAL CRUSTING: Status: RESOLVED | Noted: 2020-05-26 | Resolved: 2021-03-07

## 2021-03-07 PROBLEM — M79.604 LEG PAIN, BILATERAL: Status: RESOLVED | Noted: 2020-05-26 | Resolved: 2021-03-07

## 2021-03-07 PROBLEM — Z12.11 SCREENING FOR COLON CANCER: Status: RESOLVED | Noted: 2020-05-26 | Resolved: 2021-03-07

## 2021-03-07 PROBLEM — L85.3 XEROSIS OF SKIN: Status: ACTIVE | Noted: 2021-03-07

## 2021-03-07 PROBLEM — Z01.818 PRE-OP TESTING: Status: RESOLVED | Noted: 2020-08-01 | Resolved: 2021-03-07

## 2021-03-07 PROBLEM — Z92.29 HISTORY OF INFLUENZA VACCINATION: Status: RESOLVED | Noted: 2020-11-18 | Resolved: 2021-03-07

## 2021-03-07 PROBLEM — M79.601 PAIN IN BOTH UPPER EXTREMITIES: Status: RESOLVED | Noted: 2020-05-26 | Resolved: 2021-03-07

## 2021-03-07 PROBLEM — S62.634A CLOSED DISPLACED FRACTURE OF DISTAL PHALANX OF RIGHT RING FINGER, INITIAL ENCOUNTER: Status: RESOLVED | Noted: 2020-07-14 | Resolved: 2021-03-07

## 2021-03-07 NOTE — ASSESSMENT
[FreeTextEntry1] : \par Xerosis right heel:\par -I have advised that he use moisturizer\par \par Skin lesions of left foot\par -will give trial of triamcinolone 0.1% cream twice daily x1 to 2 weeks as needed\par -She is to notify office if symptoms persist or worsen\par \par GERD:\par -pantoprazole prn\par \par DM: last HgA1c 8.0-will check fasting labs\par -She is now on Metformin 500 mg twice daily\par -A:C negative 2020\par -On atorvastatin\par -Foot exam 3/3/2021\par -ophthalmology: 10/2020 normal exam per pt\par \par Dyslipidemia\par -Now on atorvastatin\par -I adv low fat/low chol diet and weight loss\par - check fasting labs\par \par Vitamin D Deficiency:\par -On vitamin D3 1000 units once a day\par \par Obesity:\par -BMI 31\par -risks of obesity discussed\par -benefits of weight loss discussed\par -low fat/low chol diet and low carbohydrate diet advised\par -small portion sizes encouraged\par -I advised avoidance of sugary drinks\par -aerobic exercise encouraged\par -weight loss advised\par \par \par \par HCM:\par \par CPE: 3/3/2021\par \par EK20\par \par Influenza vaccine: 2020\par \par Tdap: 2014\par \par Pneumovax:  10/2/2019\par \par HIV test: 2020 negative\par \par Hepatitis C screenin2016\par \par Depression screening: 3/3/2021-PHQ 2 score 0 negative\par \par Colonoscopy: 2020 tubular adenoma-repeat 5 years advised\par \par FIT testin2018 negative\par \par PSA 0.6 10/2019-check PSA\par \par Covid 19 antibody: positive 2020\par \par \par F/U 3 months.  He will have fasting labs done at the lab

## 2021-03-07 NOTE — PHYSICAL EXAM
[Well-Appearing] : well-appearing [Normal Voice/Communication] : normal voice/communication [Normal Sclera/Conjunctiva] : normal sclera/conjunctiva [PERRL] : pupils equal round and reactive to light [Normal Oropharynx] : the oropharynx was normal [No JVD] : no jugular venous distention [No Lymphadenopathy] : no lymphadenopathy [Supple] : supple [Thyroid Normal, No Nodules] : the thyroid was normal and there were no nodules present [No Respiratory Distress] : no respiratory distress  [No Accessory Muscle Use] : no accessory muscle use [Clear to Auscultation] : lungs were clear to auscultation bilaterally [Normal Rate] : normal rate  [Regular Rhythm] : with a regular rhythm [Normal S1, S2] : normal S1 and S2 [No Murmur] : no murmur heard [No Edema] : there was no peripheral edema [Soft] : abdomen soft [Non Tender] : non-tender [Non-distended] : non-distended [No Masses] : no abdominal mass palpated [No HSM] : no HSM [Normal Bowel Sounds] : normal bowel sounds [No Spinal Tenderness] : no spinal tenderness [No Focal Deficits] : no focal deficits [Normal Affect] : the affect was normal [Alert and Oriented x3] : oriented to person, place, and time [Normal Mood] : the mood was normal [Normal Insight/Judgement] : insight and judgment were intact [No Acute Distress] : no acute distress [Well Nourished] : well nourished [Well Developed] : well developed [Normal Outer Ear/Nose] : the outer ears and nose were normal in appearance [Normal TMs] : both tympanic membranes were normal [Normal Nasal Mucosa] : the nasal mucosa was normal [No Carotid Bruits] : no carotid bruits [No CVA Tenderness] : no CVA  tenderness [No Joint Swelling] : no joint swelling [Comprehensive Foot Exam Normal] : Right and left foot were examined and both feet are normal. No ulcers in either foot. Toes are normal and with full ROM.  Normal tactile sensation with monofilament testing throughout both feet [de-identified] : No calf tenderness [de-identified] : Right heel appears dry and cracked, left foot on top has 2 oval-shaped slightly raised erythematous skin lesions [de-identified] : + 2 DP/PT pulses B/L

## 2021-03-07 NOTE — HISTORY OF PRESENT ILLNESS
[de-identified] : Here for CPE\par \par He states he is having a hard time losing weight\par \par He reports he has a few itchy skin lesions on the top of the left foot.. He states he has had these for a while.\par \par He also reports that he has dry skin on his right heel.

## 2021-03-07 NOTE — HEALTH RISK ASSESSMENT
[No] : In the past 12 months have you used drugs other than those required for medical reasons? No [0] : 2) Feeling down, depressed, or hopeless: Not at all (0) [Employed] : employed [] : No [[id number]] : 0 [FreeTextEntry2] : kalyan

## 2021-03-07 NOTE — REVIEW OF SYSTEMS
[Fever] : no fever [Chills] : no chills [Fatigue] : no fatigue [Recent Change In Weight] : ~T no recent weight change [Chest Pain] : no chest pain [Palpitations] : no palpitations [Lower Ext Edema] : no lower extremity edema [Shortness Of Breath] : no shortness of breath [Wheezing] : no wheezing [Cough] : no cough [Dyspnea on Exertion] : not dyspnea on exertion [Abdominal Pain] : no abdominal pain [Nausea] : no nausea [Diarrhea] : no diarrhea [Vomiting] : no vomiting [Anxiety] : no anxiety [Depression] : no depression [Negative] : Genitourinary [de-identified] : See HPI

## 2021-03-22 ENCOUNTER — NON-APPOINTMENT (OUTPATIENT)
Age: 56
End: 2021-03-22

## 2021-05-29 ENCOUNTER — RX RENEWAL (OUTPATIENT)
Age: 56
End: 2021-05-29

## 2021-06-01 ENCOUNTER — RX RENEWAL (OUTPATIENT)
Age: 56
End: 2021-06-01

## 2021-06-02 ENCOUNTER — APPOINTMENT (OUTPATIENT)
Dept: INTERNAL MEDICINE | Facility: CLINIC | Age: 56
End: 2021-06-02

## 2021-08-25 ENCOUNTER — RX RENEWAL (OUTPATIENT)
Age: 56
End: 2021-08-25

## 2021-09-07 ENCOUNTER — NON-APPOINTMENT (OUTPATIENT)
Age: 56
End: 2021-09-07

## 2021-09-07 ENCOUNTER — APPOINTMENT (OUTPATIENT)
Dept: INTERNAL MEDICINE | Facility: CLINIC | Age: 56
End: 2021-09-07
Payer: COMMERCIAL

## 2021-09-07 ENCOUNTER — EMERGENCY (EMERGENCY)
Facility: HOSPITAL | Age: 56
LOS: 1 days | Discharge: HOME | End: 2021-09-07
Attending: EMERGENCY MEDICINE
Payer: COMMERCIAL

## 2021-09-07 VITALS
WEIGHT: 177.91 LBS | DIASTOLIC BLOOD PRESSURE: 73 MMHG | HEIGHT: 65 IN | TEMPERATURE: 98 F | HEART RATE: 77 BPM | SYSTOLIC BLOOD PRESSURE: 113 MMHG | RESPIRATION RATE: 18 BRPM | OXYGEN SATURATION: 97 %

## 2021-09-07 VITALS
HEART RATE: 94 BPM | RESPIRATION RATE: 16 BRPM | BODY MASS INDEX: 28 KG/M2 | WEIGHT: 164 LBS | TEMPERATURE: 98.2 F | HEIGHT: 64 IN | SYSTOLIC BLOOD PRESSURE: 120 MMHG | DIASTOLIC BLOOD PRESSURE: 80 MMHG | OXYGEN SATURATION: 98 %

## 2021-09-07 DIAGNOSIS — R63.1 POLYDIPSIA: ICD-10-CM

## 2021-09-07 DIAGNOSIS — R63.4 ABNORMAL WEIGHT LOSS: ICD-10-CM

## 2021-09-07 DIAGNOSIS — N48.1 BALANITIS: ICD-10-CM

## 2021-09-07 DIAGNOSIS — Z23 ENCOUNTER FOR IMMUNIZATION: ICD-10-CM

## 2021-09-07 LAB
A1C WITH ESTIMATED AVERAGE GLUCOSE RESULT: 12.7 % — HIGH (ref 4–5.6)
ACETONE SERPL-MCNC: NEGATIVE — SIGNIFICANT CHANGE UP
ALBUMIN SERPL ELPH-MCNC: 4.6 G/DL — SIGNIFICANT CHANGE UP (ref 3.3–5.2)
ALP SERPL-CCNC: 150 U/L — HIGH (ref 40–120)
ALT FLD-CCNC: 25 U/L — SIGNIFICANT CHANGE UP
ANION GAP SERPL CALC-SCNC: 15 MMOL/L — SIGNIFICANT CHANGE UP (ref 5–17)
APPEARANCE UR: CLEAR — SIGNIFICANT CHANGE UP
AST SERPL-CCNC: 19 U/L — SIGNIFICANT CHANGE UP
BACTERIA # UR AUTO: NEGATIVE — SIGNIFICANT CHANGE UP
BASE EXCESS BLDV CALC-SCNC: -0.5 MMOL/L — SIGNIFICANT CHANGE UP (ref -2–3)
BASOPHILS # BLD AUTO: 0.06 K/UL — SIGNIFICANT CHANGE UP (ref 0–0.2)
BASOPHILS NFR BLD AUTO: 0.8 % — SIGNIFICANT CHANGE UP (ref 0–2)
BILIRUB SERPL-MCNC: 0.5 MG/DL — SIGNIFICANT CHANGE UP (ref 0.4–2)
BILIRUB UR QL STRIP: NEGATIVE
BILIRUB UR-MCNC: NEGATIVE — SIGNIFICANT CHANGE UP
BUN SERPL-MCNC: 17.2 MG/DL — SIGNIFICANT CHANGE UP (ref 8–20)
CA-I SERPL-SCNC: 1.17 MMOL/L — SIGNIFICANT CHANGE UP (ref 1.15–1.33)
CALCIUM SERPL-MCNC: 10.3 MG/DL — HIGH (ref 8.6–10.2)
CHLORIDE BLDV-SCNC: 102 MMOL/L — SIGNIFICANT CHANGE UP (ref 98–107)
CHLORIDE SERPL-SCNC: 95 MMOL/L — LOW (ref 98–107)
CLARITY UR: CLEAR
CO2 SERPL-SCNC: 24 MMOL/L — SIGNIFICANT CHANGE UP (ref 22–29)
COLLECTION METHOD: NORMAL
COLOR SPEC: SIGNIFICANT CHANGE UP
CREAT SERPL-MCNC: 1.1 MG/DL — SIGNIFICANT CHANGE UP (ref 0.5–1.3)
DIFF PNL FLD: ABNORMAL
EOSINOPHIL # BLD AUTO: 0.03 K/UL — SIGNIFICANT CHANGE UP (ref 0–0.5)
EOSINOPHIL NFR BLD AUTO: 0.4 % — SIGNIFICANT CHANGE UP (ref 0–6)
EPI CELLS # UR: NEGATIVE — SIGNIFICANT CHANGE UP
ESTIMATED AVERAGE GLUCOSE: 318 MG/DL — HIGH (ref 68–114)
GAS PNL BLDV: 136 MMOL/L — SIGNIFICANT CHANGE UP (ref 136–145)
GAS PNL BLDV: SIGNIFICANT CHANGE UP
GLUCOSE BLDC GLUCOMTR-MCNC: 308 MG/DL — HIGH (ref 70–99)
GLUCOSE BLDC GLUCOMTR-MCNC: 322 MG/DL — HIGH (ref 70–99)
GLUCOSE BLDC GLUCOMTR-MCNC: 354 MG/DL — HIGH (ref 70–99)
GLUCOSE BLDC GLUCOMTR-MCNC: 580
GLUCOSE BLDC GLUCOMTR-MCNC: 595
GLUCOSE BLDV-MCNC: 453 MG/DL — CRITICAL HIGH (ref 70–99)
GLUCOSE SERPL-MCNC: 519 MG/DL — CRITICAL HIGH (ref 70–99)
GLUCOSE UR QL: 1000 MG/DL
GLUCOSE UR-MCNC: 1000
HCG UR QL: 0.2 EU/DL
HCO3 BLDV-SCNC: 25 MMOL/L — SIGNIFICANT CHANGE UP (ref 22–29)
HCT VFR BLD CALC: 48.7 % — SIGNIFICANT CHANGE UP (ref 39–50)
HCT VFR BLDA CALC: 45 % — SIGNIFICANT CHANGE UP (ref 39–51)
HGB BLD CALC-MCNC: 14.9 G/DL — SIGNIFICANT CHANGE UP (ref 12.6–17.4)
HGB BLD-MCNC: 16.3 G/DL — SIGNIFICANT CHANGE UP (ref 13–17)
HGB UR QL STRIP.AUTO: NEGATIVE
IMM GRANULOCYTES NFR BLD AUTO: 0.3 % — SIGNIFICANT CHANGE UP (ref 0–1.5)
KETONES UR-MCNC: ABNORMAL
KETONES UR-MCNC: NEGATIVE
LACTATE BLDV-MCNC: 2.2 MMOL/L — HIGH (ref 0.5–2)
LACTATE BLDV-MCNC: 2.6 MMOL/L — HIGH (ref 0.5–2)
LEUKOCYTE ESTERASE UR QL STRIP: NEGATIVE
LEUKOCYTE ESTERASE UR-ACNC: NEGATIVE — SIGNIFICANT CHANGE UP
LYMPHOCYTES # BLD AUTO: 1.62 K/UL — SIGNIFICANT CHANGE UP (ref 1–3.3)
LYMPHOCYTES # BLD AUTO: 22.7 % — SIGNIFICANT CHANGE UP (ref 13–44)
MCHC RBC-ENTMCNC: 29.1 PG — SIGNIFICANT CHANGE UP (ref 27–34)
MCHC RBC-ENTMCNC: 33.5 GM/DL — SIGNIFICANT CHANGE UP (ref 32–36)
MCV RBC AUTO: 87 FL — SIGNIFICANT CHANGE UP (ref 80–100)
MONOCYTES # BLD AUTO: 0.55 K/UL — SIGNIFICANT CHANGE UP (ref 0–0.9)
MONOCYTES NFR BLD AUTO: 7.7 % — SIGNIFICANT CHANGE UP (ref 2–14)
NEUTROPHILS # BLD AUTO: 4.85 K/UL — SIGNIFICANT CHANGE UP (ref 1.8–7.4)
NEUTROPHILS NFR BLD AUTO: 68.1 % — SIGNIFICANT CHANGE UP (ref 43–77)
NITRITE UR QL STRIP: NEGATIVE
NITRITE UR-MCNC: NEGATIVE — SIGNIFICANT CHANGE UP
PCO2 BLDV: 44 MMHG — SIGNIFICANT CHANGE UP (ref 42–55)
PH BLDV: 7.36 — SIGNIFICANT CHANGE UP (ref 7.32–7.43)
PH UR STRIP: 6.5
PH UR: 7 — SIGNIFICANT CHANGE UP (ref 5–8)
PLATELET # BLD AUTO: 241 K/UL — SIGNIFICANT CHANGE UP (ref 150–400)
PO2 BLDV: 114 MMHG — HIGH (ref 25–45)
POTASSIUM BLDV-SCNC: 4.1 MMOL/L — SIGNIFICANT CHANGE UP (ref 3.5–5.1)
POTASSIUM SERPL-MCNC: 4.6 MMOL/L — SIGNIFICANT CHANGE UP (ref 3.5–5.3)
POTASSIUM SERPL-SCNC: 4.6 MMOL/L — SIGNIFICANT CHANGE UP (ref 3.5–5.3)
PROT SERPL-MCNC: 8.2 G/DL — SIGNIFICANT CHANGE UP (ref 6.6–8.7)
PROT UR STRIP-MCNC: NEGATIVE
PROT UR-MCNC: NEGATIVE MG/DL — SIGNIFICANT CHANGE UP
RBC # BLD: 5.6 M/UL — SIGNIFICANT CHANGE UP (ref 4.2–5.8)
RBC # FLD: 12 % — SIGNIFICANT CHANGE UP (ref 10.3–14.5)
RBC CASTS # UR COMP ASSIST: NEGATIVE /HPF — SIGNIFICANT CHANGE UP (ref 0–4)
SAO2 % BLDV: 99.8 % — SIGNIFICANT CHANGE UP
SODIUM SERPL-SCNC: 134 MMOL/L — LOW (ref 135–145)
SP GR SPEC: 1.01 — SIGNIFICANT CHANGE UP (ref 1.01–1.02)
SP GR UR STRIP: 1.01
UROBILINOGEN FLD QL: NEGATIVE MG/DL — SIGNIFICANT CHANGE UP
WBC # BLD: 7.13 K/UL — SIGNIFICANT CHANGE UP (ref 3.8–10.5)
WBC # FLD AUTO: 7.13 K/UL — SIGNIFICANT CHANGE UP (ref 3.8–10.5)
WBC UR QL: NEGATIVE — SIGNIFICANT CHANGE UP

## 2021-09-07 PROCEDURE — 99214 OFFICE O/P EST MOD 30 MIN: CPT | Mod: 25

## 2021-09-07 PROCEDURE — 99220: CPT

## 2021-09-07 PROCEDURE — 82962 GLUCOSE BLOOD TEST: CPT

## 2021-09-07 PROCEDURE — 93010 ELECTROCARDIOGRAM REPORT: CPT

## 2021-09-07 PROCEDURE — 81003 URINALYSIS AUTO W/O SCOPE: CPT | Mod: QW

## 2021-09-07 RX ORDER — MULTIVIT-MIN/FOLIC/VIT K/LYCOP 400-300MCG
25 MCG TABLET ORAL DAILY
Qty: 90 | Refills: 0 | Status: DISCONTINUED | COMMUNITY
Start: 2020-02-25 | End: 2021-09-07

## 2021-09-07 RX ORDER — ATORVASTATIN CALCIUM 80 MG/1
1 TABLET, FILM COATED ORAL
Qty: 0 | Refills: 0 | DISCHARGE

## 2021-09-07 RX ORDER — PANTOPRAZOLE SODIUM 20 MG/1
1 TABLET, DELAYED RELEASE ORAL
Qty: 0 | Refills: 0 | DISCHARGE

## 2021-09-07 RX ORDER — METFORMIN HYDROCHLORIDE 850 MG/1
1 TABLET ORAL
Qty: 0 | Refills: 0 | DISCHARGE

## 2021-09-07 RX ORDER — DEXTROSE 50 % IN WATER 50 %
25 SYRINGE (ML) INTRAVENOUS ONCE
Refills: 0 | Status: DISCONTINUED | OUTPATIENT
Start: 2021-09-07 | End: 2021-09-12

## 2021-09-07 RX ORDER — SODIUM CHLORIDE 9 MG/ML
1000 INJECTION, SOLUTION INTRAVENOUS
Refills: 0 | Status: DISCONTINUED | OUTPATIENT
Start: 2021-09-07 | End: 2021-09-12

## 2021-09-07 RX ORDER — DEXTROSE 50 % IN WATER 50 %
12.5 SYRINGE (ML) INTRAVENOUS ONCE
Refills: 0 | Status: DISCONTINUED | OUTPATIENT
Start: 2021-09-07 | End: 2021-09-12

## 2021-09-07 RX ORDER — ISOPROPYL ALCOHOL, BENZOCAINE .7; .06 ML/ML; ML/ML
1 SWAB TOPICAL
Qty: 100 | Refills: 1
Start: 2021-09-07 | End: 2021-10-26

## 2021-09-07 RX ORDER — ATORVASTATIN CALCIUM 80 MG/1
20 TABLET, FILM COATED ORAL AT BEDTIME
Refills: 0 | Status: DISCONTINUED | OUTPATIENT
Start: 2021-09-07 | End: 2021-09-12

## 2021-09-07 RX ORDER — GLUCAGON INJECTION, SOLUTION 0.5 MG/.1ML
1 INJECTION, SOLUTION SUBCUTANEOUS ONCE
Refills: 0 | Status: DISCONTINUED | OUTPATIENT
Start: 2021-09-07 | End: 2021-09-12

## 2021-09-07 RX ORDER — INSULIN HUMAN 100 [IU]/ML
6 INJECTION, SOLUTION SUBCUTANEOUS ONCE
Refills: 0 | Status: COMPLETED | OUTPATIENT
Start: 2021-09-07 | End: 2021-09-07

## 2021-09-07 RX ORDER — INSULIN LISPRO 100/ML
VIAL (ML) SUBCUTANEOUS
Refills: 0 | Status: DISCONTINUED | OUTPATIENT
Start: 2021-09-07 | End: 2021-09-12

## 2021-09-07 RX ORDER — PANTOPRAZOLE SODIUM 20 MG/1
40 TABLET, DELAYED RELEASE ORAL
Refills: 0 | Status: DISCONTINUED | OUTPATIENT
Start: 2021-09-07 | End: 2021-09-12

## 2021-09-07 RX ORDER — SODIUM CHLORIDE 9 MG/ML
1000 INJECTION INTRAMUSCULAR; INTRAVENOUS; SUBCUTANEOUS ONCE
Refills: 0 | Status: COMPLETED | OUTPATIENT
Start: 2021-09-07 | End: 2021-09-07

## 2021-09-07 RX ORDER — DEXTROSE 50 % IN WATER 50 %
15 SYRINGE (ML) INTRAVENOUS ONCE
Refills: 0 | Status: DISCONTINUED | OUTPATIENT
Start: 2021-09-07 | End: 2021-09-12

## 2021-09-07 RX ORDER — INSULIN GLARGINE 100 [IU]/ML
24 INJECTION, SOLUTION SUBCUTANEOUS AT BEDTIME
Refills: 0 | Status: DISCONTINUED | OUTPATIENT
Start: 2021-09-07 | End: 2021-09-08

## 2021-09-07 RX ADMIN — SODIUM CHLORIDE 1000 MILLILITER(S): 9 INJECTION INTRAMUSCULAR; INTRAVENOUS; SUBCUTANEOUS at 13:19

## 2021-09-07 RX ADMIN — Medication 10: at 17:18

## 2021-09-07 RX ADMIN — INSULIN GLARGINE 24 UNIT(S): 100 INJECTION, SOLUTION SUBCUTANEOUS at 22:03

## 2021-09-07 RX ADMIN — SODIUM CHLORIDE 1000 MILLILITER(S): 9 INJECTION INTRAMUSCULAR; INTRAVENOUS; SUBCUTANEOUS at 14:12

## 2021-09-07 RX ADMIN — ATORVASTATIN CALCIUM 20 MILLIGRAM(S): 80 TABLET, FILM COATED ORAL at 22:03

## 2021-09-07 RX ADMIN — INSULIN HUMAN 6 UNIT(S): 100 INJECTION, SOLUTION SUBCUTANEOUS at 15:52

## 2021-09-07 NOTE — ED ADULT TRIAGE NOTE - CHIEF COMPLAINT QUOTE
Patient sent to the ED from urgent care for high blood sugar. Pt states that he take medication at home for his diabetes but he does not check his sugar. Pt reports 20 lb weight loss, frequent urination and increased thirst. BS in triage 499

## 2021-09-07 NOTE — ASSESSMENT
[FreeTextEntry1] : \par DM: Now with polyuria, polydipsia, feeling sleepy, weight loss \par -Fingerstick glucose in office today 580 and 595\par -last HgA1c 7.1 3/2021 on Metformin 500 mg twice daily\par -I discussed with patient that his diabetes is very poorly controlled and this is likely causing his symptoms and weight loss\par -I explained to him that he may be dehydrated and likely needs insulin and IV fluids\par -I have advised that he go to the emergency room for evaluation of symptomatic uncontrolled diabetes\par -He was agreeable with plan and will go to ER today\par -On atorvastatin-lipids at goal 3/2021\par -Foot exam 3/3/2021\par -ophthalmology: 10/2020 normal exam per pt\par \par Balanitis:\par -trial of clotrimazole 1% cream BID x 1-2 weeks\par -he should notify office if sx persist or worsen\par -he should keep area clean and dry\par \par GERD:\par -pantoprazole prn\par \par Dyslipidemia\par -on atorvastatin\par -lipids at goal 3/2021\par \par Vitamin D Deficiency:\par -Check vitamin D with next labs\par \par HCM:\par \par CPE: 3/3/2021\par \par EK20\par \par Influenza vaccine: Will discuss at next visit\par \par Tdap: 2014\par \par Pneumovax:  10/2/2019\par \par Covid vaccine: Pfizer\par \par HIV test: 2020 negative\par \par Hepatitis C screenin2016\par \par Depression screening: 3/3/2021-PHQ 2 score 0 negative\par \par Colonoscopy: 2020 tubular adenoma-repeat 5 years advised\par \par FIT testin2018 negative\par \par PSA 0.6 3/2021\par \par \par \par F/U follow-up with office after hospital discharge

## 2021-09-07 NOTE — ED ADULT NURSE NOTE - OBJECTIVE STATEMENT
56y male AOx4 c/o hyperglycemia, blood sugar in PMD noted to be 580. pt endorses polyuria, polydipsia. denies nausea/vomiting, dizziness, diaphoresis. respirations even and unlabored. denies chest discomfort. abd soft and nondistended. denies abd discomfort. skin WNL for race.

## 2021-09-07 NOTE — PHYSICAL EXAM
[No Acute Distress] : no acute distress [Well Nourished] : well nourished [Well Developed] : well developed [Well-Appearing] : well-appearing [Normal Voice/Communication] : normal voice/communication [Normal Sclera/Conjunctiva] : normal sclera/conjunctiva [PERRL] : pupils equal round and reactive to light [Normal Oropharynx] : the oropharynx was normal [No JVD] : no jugular venous distention [No Lymphadenopathy] : no lymphadenopathy [Supple] : supple [Thyroid Normal, No Nodules] : the thyroid was normal and there were no nodules present [No Respiratory Distress] : no respiratory distress  [No Accessory Muscle Use] : no accessory muscle use [Clear to Auscultation] : lungs were clear to auscultation bilaterally [Normal Rate] : normal rate  [Regular Rhythm] : with a regular rhythm [Normal S1, S2] : normal S1 and S2 [No Murmur] : no murmur heard [No Edema] : there was no peripheral edema [Soft] : abdomen soft [Non Tender] : non-tender [Non-distended] : non-distended [No Masses] : no abdominal mass palpated [No HSM] : no HSM [Normal Bowel Sounds] : normal bowel sounds [No Spinal Tenderness] : no spinal tenderness [No Joint Swelling] : no joint swelling [No Focal Deficits] : no focal deficits [Normal Affect] : the affect was normal [Alert and Oriented x3] : oriented to person, place, and time [Normal Mood] : the mood was normal [Normal Insight/Judgement] : insight and judgment were intact [de-identified] : VERA [de-identified] : No calf tenderness [de-identified] : penis around coronal her has some mild irriated serthtrmeus skin lesions consistent with balanitis.  No ulcers or vesicles

## 2021-09-07 NOTE — ED PROVIDER NOTE - NS ED ROS FT
Denies f/c/n/v/cp/sob/palpitations/ cough/rash/headache/dizziness/abd.pain/d/c/hematuria  +generalized weakness polydypsia polyuria weight loss

## 2021-09-07 NOTE — ED ADULT NURSE NOTE - CAS ELECT INFOMATION PROVIDED
DC instructions provided and reviewed with pt and daughter. Patient and daughter in understanding of all dc instructions. No further questions for the RN regarding DC or medication regimen. Patient and daughter verbalizes understanding of use of two different insulins, correct amount and site, s/s of hypo and hyperglycemia. Ambulatory. VSS./DC instructions

## 2021-09-07 NOTE — REVIEW OF SYSTEMS
[Negative] : Psychiatric [Fever] : no fever [Chills] : no chills [Chest Pain] : no chest pain [Palpitations] : no palpitations [Lower Ext Edema] : no lower extremity edema [Shortness Of Breath] : no shortness of breath [Wheezing] : no wheezing [Cough] : no cough [Dyspnea on Exertion] : not dyspnea on exertion [Abdominal Pain] : no abdominal pain [Nausea] : no nausea [Diarrhea] : no diarrhea [Vomiting] : no vomiting [Anxiety] : no anxiety [Depression] : no depression [FreeTextEntry2] : weight loss and feels sleepy [de-identified] : See HPI

## 2021-09-07 NOTE — CHART NOTE - NSCHARTNOTEFT_GEN_A_CORE
CCC spoke with patient along with [place] speaking CCC Ninfa.  Patient agrees to [facility] services.  Patient may be d/c'd home with insulin and does not own or know how to use a glucometer.  PA made aware.  CCC to follow.

## 2021-09-07 NOTE — ED CDU PROVIDER INITIAL DAY NOTE - MEDICAL DECISION MAKING DETAILS
55yo male with polyuria, polydipsia, h/o DM, found to have hyperglycemia, Hb A1c 12. Will place in obs for glucose control, hyperglycemia protocol. Nini Wiggins DO

## 2021-09-07 NOTE — ED CDU PROVIDER INITIAL DAY NOTE - OBJECTIVE STATEMENT
55yo male with PMH DM, HLD presenting with hyperglycemia. Patient was at doctor's office today when he was told that his blood sugar was very high. Patient states that he is compliant with his medications. Patient states that over last few weeks he has been feeling more fatigued, 20lb weight loss over last 3 weeks. +Polyuria/polydipsia.

## 2021-09-07 NOTE — ED PROVIDER NOTE - CLINICAL SUMMARY MEDICAL DECISION MAKING FREE TEXT BOX
hyperglycemia; check labs to ro dka hgba1c possible obs only on oral meds at home notes that compliant (take one pill in the morning one at night - unsure which one)--reassess

## 2021-09-07 NOTE — ED ADULT NURSE REASSESSMENT NOTE - NS ED NURSE REASSESS COMMENT FT1
Pt received at 2015 from previous RN, documentation as noted. PT has no complaints of pain or discomfort at this time. Will continue to monitor.

## 2021-09-07 NOTE — ED PROVIDER NOTE - PHYSICAL EXAMINATION
Head: atraumatic, normacephalic  Face: atraumatic, no crepitus no orbiral/maxillary/mandibular ttp  throat: uvula midline no exudates  eyes: perrla eomi  heart: rrr s1s2  lungs: ctab  abd: soft, nt nd +bs no rebound/guarding no cva ttp  skin: warm  LE: no swelling, no calf ttp  back: no midline cervical/thoracic/lumbar ttp  neuro: aaox 3 cn iii-xii intact strength 5/5 in upper and lower

## 2021-09-07 NOTE — ED PROVIDER NOTE - OBJECTIVE STATEMENT
55yo M hx of DM HLD pw hyperglycemia. notes went to his pmd for a regular visit and his sugar in the office was 580 so was sent to the ED. notes 20lbs weight loss over last month, with polydypsia/polyuriam feeling weak. Denies f/c/n/v/cp/sob/palpitations/ cough/rash/headache/dizziness/abd.pain/d/c//hematuria. no sick contacts no recent travel. COVID vaccinated.

## 2021-09-08 PROBLEM — E78.5 HYPERLIPIDEMIA, UNSPECIFIED: Chronic | Status: ACTIVE | Noted: 2021-09-07

## 2021-09-08 PROBLEM — E11.9 TYPE 2 DIABETES MELLITUS WITHOUT COMPLICATIONS: Chronic | Status: ACTIVE | Noted: 2021-09-07

## 2021-09-08 LAB
ALBUMIN SERPL ELPH-MCNC: 4.1 G/DL — SIGNIFICANT CHANGE UP (ref 3.3–5.2)
ALP SERPL-CCNC: 106 U/L — SIGNIFICANT CHANGE UP (ref 40–120)
ALT FLD-CCNC: 21 U/L — SIGNIFICANT CHANGE UP
ANION GAP SERPL CALC-SCNC: 11 MMOL/L — SIGNIFICANT CHANGE UP (ref 5–17)
APPEARANCE: CLEAR
AST SERPL-CCNC: 16 U/L — SIGNIFICANT CHANGE UP
BACTERIA: NEGATIVE
BASOPHILS # BLD AUTO: 0.05 K/UL — SIGNIFICANT CHANGE UP (ref 0–0.2)
BASOPHILS NFR BLD AUTO: 0.8 % — SIGNIFICANT CHANGE UP (ref 0–2)
BILIRUB SERPL-MCNC: 0.5 MG/DL — SIGNIFICANT CHANGE UP (ref 0.4–2)
BILIRUBIN URINE: NEGATIVE
BLOOD URINE: NEGATIVE
BUN SERPL-MCNC: 21.4 MG/DL — HIGH (ref 8–20)
CALCIUM SERPL-MCNC: 9.3 MG/DL — SIGNIFICANT CHANGE UP (ref 8.6–10.2)
CHLORIDE SERPL-SCNC: 99 MMOL/L — SIGNIFICANT CHANGE UP (ref 98–107)
CO2 SERPL-SCNC: 23 MMOL/L — SIGNIFICANT CHANGE UP (ref 22–29)
COLOR: COLORLESS
CREAT SERPL-MCNC: 0.82 MG/DL — SIGNIFICANT CHANGE UP (ref 0.5–1.3)
CREAT SPEC-SCNC: 18 MG/DL
CULTURE RESULTS: SIGNIFICANT CHANGE UP
EOSINOPHIL # BLD AUTO: 0.06 K/UL — SIGNIFICANT CHANGE UP (ref 0–0.5)
EOSINOPHIL NFR BLD AUTO: 1 % — SIGNIFICANT CHANGE UP (ref 0–6)
GLUCOSE BLDC GLUCOMTR-MCNC: 267 MG/DL — HIGH (ref 70–99)
GLUCOSE BLDC GLUCOMTR-MCNC: 269 MG/DL — HIGH (ref 70–99)
GLUCOSE BLDC GLUCOMTR-MCNC: 270 MG/DL — HIGH (ref 70–99)
GLUCOSE BLDC GLUCOMTR-MCNC: 281 MG/DL — HIGH (ref 70–99)
GLUCOSE BLDC GLUCOMTR-MCNC: 342 MG/DL — HIGH (ref 70–99)
GLUCOSE QUALITATIVE U: ABNORMAL
GLUCOSE SERPL-MCNC: 293 MG/DL — HIGH (ref 70–99)
HCT VFR BLD CALC: 44.5 % — SIGNIFICANT CHANGE UP (ref 39–50)
HGB BLD-MCNC: 15 G/DL — SIGNIFICANT CHANGE UP (ref 13–17)
HYALINE CASTS: 0 /LPF
IMM GRANULOCYTES NFR BLD AUTO: 0.3 % — SIGNIFICANT CHANGE UP (ref 0–1.5)
KETONES URINE: NEGATIVE
LEUKOCYTE ESTERASE URINE: NEGATIVE
LYMPHOCYTES # BLD AUTO: 1.87 K/UL — SIGNIFICANT CHANGE UP (ref 1–3.3)
LYMPHOCYTES # BLD AUTO: 29.7 % — SIGNIFICANT CHANGE UP (ref 13–44)
MCHC RBC-ENTMCNC: 29.5 PG — SIGNIFICANT CHANGE UP (ref 27–34)
MCHC RBC-ENTMCNC: 33.7 GM/DL — SIGNIFICANT CHANGE UP (ref 32–36)
MCV RBC AUTO: 87.6 FL — SIGNIFICANT CHANGE UP (ref 80–100)
MICROALBUMIN 24H UR DL<=1MG/L-MCNC: 1.3 MG/DL
MICROALBUMIN/CREAT 24H UR-RTO: 74 MG/G
MICROSCOPIC-UA: NORMAL
MONOCYTES # BLD AUTO: 0.43 K/UL — SIGNIFICANT CHANGE UP (ref 0–0.9)
MONOCYTES NFR BLD AUTO: 6.8 % — SIGNIFICANT CHANGE UP (ref 2–14)
NEUTROPHILS # BLD AUTO: 3.86 K/UL — SIGNIFICANT CHANGE UP (ref 1.8–7.4)
NEUTROPHILS NFR BLD AUTO: 61.4 % — SIGNIFICANT CHANGE UP (ref 43–77)
NITRITE URINE: NEGATIVE
PH URINE: 6.5
PLATELET # BLD AUTO: 214 K/UL — SIGNIFICANT CHANGE UP (ref 150–400)
POTASSIUM SERPL-MCNC: 3.9 MMOL/L — SIGNIFICANT CHANGE UP (ref 3.5–5.3)
POTASSIUM SERPL-SCNC: 3.9 MMOL/L — SIGNIFICANT CHANGE UP (ref 3.5–5.3)
PROT SERPL-MCNC: 7.2 G/DL — SIGNIFICANT CHANGE UP (ref 6.6–8.7)
PROTEIN URINE: NEGATIVE
RBC # BLD: 5.08 M/UL — SIGNIFICANT CHANGE UP (ref 4.2–5.8)
RBC # FLD: 11.9 % — SIGNIFICANT CHANGE UP (ref 10.3–14.5)
RED BLOOD CELLS URINE: 0 /HPF
SODIUM SERPL-SCNC: 133 MMOL/L — LOW (ref 135–145)
SPECIFIC GRAVITY URINE: 1.04
SPECIMEN SOURCE: SIGNIFICANT CHANGE UP
SQUAMOUS EPITHELIAL CELLS: 0 /HPF
UROBILINOGEN URINE: NORMAL
WBC # BLD: 6.29 K/UL — SIGNIFICANT CHANGE UP (ref 3.8–10.5)
WBC # FLD AUTO: 6.29 K/UL — SIGNIFICANT CHANGE UP (ref 3.8–10.5)
WHITE BLOOD CELLS URINE: 2 /HPF

## 2021-09-08 PROCEDURE — 99226: CPT

## 2021-09-08 RX ORDER — SODIUM CHLORIDE 9 MG/ML
1000 INJECTION, SOLUTION INTRAVENOUS
Refills: 0 | Status: DISCONTINUED | OUTPATIENT
Start: 2021-09-08 | End: 2021-09-12

## 2021-09-08 RX ORDER — INSULIN GLARGINE 100 [IU]/ML
30 INJECTION, SOLUTION SUBCUTANEOUS AT BEDTIME
Refills: 0 | Status: DISCONTINUED | OUTPATIENT
Start: 2021-09-08 | End: 2021-09-09

## 2021-09-08 RX ORDER — INSULIN LISPRO 100/ML
10 VIAL (ML) SUBCUTANEOUS
Refills: 0 | Status: DISCONTINUED | OUTPATIENT
Start: 2021-09-08 | End: 2021-09-09

## 2021-09-08 RX ADMIN — INSULIN GLARGINE 30 UNIT(S): 100 INJECTION, SOLUTION SUBCUTANEOUS at 22:42

## 2021-09-08 RX ADMIN — SODIUM CHLORIDE 100 MILLILITER(S): 9 INJECTION, SOLUTION INTRAVENOUS at 23:27

## 2021-09-08 RX ADMIN — Medication 6: at 17:13

## 2021-09-08 RX ADMIN — Medication 6: at 11:37

## 2021-09-08 RX ADMIN — PANTOPRAZOLE SODIUM 40 MILLIGRAM(S): 20 TABLET, DELAYED RELEASE ORAL at 06:40

## 2021-09-08 RX ADMIN — ATORVASTATIN CALCIUM 20 MILLIGRAM(S): 80 TABLET, FILM COATED ORAL at 22:49

## 2021-09-08 RX ADMIN — Medication 10 UNIT(S): at 17:13

## 2021-09-08 RX ADMIN — Medication 10 UNIT(S): at 11:37

## 2021-09-08 RX ADMIN — Medication 8: at 06:39

## 2021-09-08 NOTE — ED ADULT NURSE REASSESSMENT NOTE - NS ED NURSE REASSESS COMMENT FT1
Assumed care of the patient @8030. Pt A&Ox4, VSS afebrile. Pt denies any c/o pain or discomfort. Patient in understanding of plan of care. Patient with no further questions for the RN. Resting in comfort. Call bell within reach and encouraged to use when assistance needed. Will continue to monitor.

## 2021-09-08 NOTE — ED ADULT NURSE REASSESSMENT NOTE - NS ED NURSE REASSESS COMMENT FT1
Patient taught how to self inject and draw up insulin. Pt needing additional instruction unable to perform at this time. Diabetic diet reviewed. Diabetic teaching continued. Questions answered.

## 2021-09-08 NOTE — ED CDU PROVIDER SUBSEQUENT DAY NOTE - ATTENDING CONTRIBUTION TO CARE
The patient seen and examined.    Hyperglycemia    I, Naga Alegria, performed the initial face to face bedside interview with this patient regarding history of present illness, review of symptoms and relevant past medical, social and family history.  I completed an independent physical examination.  I was the initial provider who evaluated this patient. I have signed out the follow up of any pending tests (i.e. labs, radiological studies) to the resident.  I have communicated the patient’s plan of care and disposition with the resident The patient seen and examined.    Hyperglycemia    I, Naga Alegria, performed the initial face to face bedside interview with this patient regarding history of present illness, review of symptoms and relevant past medical, social and family history.  I completed an independent physical examination.  I was the initial provider who evaluated this patient. I have signed out the follow up of any pending tests (i.e. labs, radiological studies) to the ACP.  I have communicated the patient’s plan of care and disposition with the ACP

## 2021-09-08 NOTE — CHART NOTE - NSCHARTNOTEFT_GEN_A_CORE
Outpatient Follow Up Scheduled with NP Helio on Tuesday 9/28/21 at 8:00am  Endocrinology  [address] [place] [address]  Phone: [phone]    *the office will reach out to patient if sooner appointment becomes available

## 2021-09-08 NOTE — ED ADULT NURSE REASSESSMENT NOTE - NS ED NURSE REASSESS COMMENT FT1
Patient is resting comfortably. Patient is currently sleeping at this time. pt educated on plan of care, pt able to successfully teach back plan of care to RN, RN will continue to reeducate pt during hospital stay.

## 2021-09-08 NOTE — ED CDU PROVIDER SUBSEQUENT DAY NOTE - PROGRESS NOTE DETAILS
Pt assessed at bedside via ED  Lelo Pozo  Pt reports he is feeling better     Spoke to [facility] Diabetes educator; she is recommending Lantus 30 units at bedtime, 10 units admelog prior to meals plus moderate sliding scale Pt assessed at bedside via ED  Lelo Pozo  Pt reports he is feeling better ; aware of plan to stay until tomorrow for better glycemic control    Spoke to [facility] Diabetes educator; she is recommending Lantus 30 units at bedtime, 10 units admelog prior to meals plus moderate sliding scale

## 2021-09-09 VITALS
DIASTOLIC BLOOD PRESSURE: 65 MMHG | TEMPERATURE: 98 F | SYSTOLIC BLOOD PRESSURE: 106 MMHG | HEART RATE: 65 BPM | RESPIRATION RATE: 18 BRPM | OXYGEN SATURATION: 96 %

## 2021-09-09 LAB
ALBUMIN SERPL ELPH-MCNC: 4.3 G/DL — SIGNIFICANT CHANGE UP (ref 3.3–5.2)
ALP SERPL-CCNC: 90 U/L — SIGNIFICANT CHANGE UP (ref 40–120)
ALT FLD-CCNC: 22 U/L — SIGNIFICANT CHANGE UP
ANION GAP SERPL CALC-SCNC: 11 MMOL/L — SIGNIFICANT CHANGE UP (ref 5–17)
AST SERPL-CCNC: 18 U/L — SIGNIFICANT CHANGE UP
BASOPHILS # BLD AUTO: 0.05 K/UL — SIGNIFICANT CHANGE UP (ref 0–0.2)
BASOPHILS NFR BLD AUTO: 0.8 % — SIGNIFICANT CHANGE UP (ref 0–2)
BILIRUB SERPL-MCNC: 0.6 MG/DL — SIGNIFICANT CHANGE UP (ref 0.4–2)
BUN SERPL-MCNC: 22.5 MG/DL — HIGH (ref 8–20)
CALCIUM SERPL-MCNC: 9.2 MG/DL — SIGNIFICANT CHANGE UP (ref 8.6–10.2)
CHLORIDE SERPL-SCNC: 98 MMOL/L — SIGNIFICANT CHANGE UP (ref 98–107)
CO2 SERPL-SCNC: 26 MMOL/L — SIGNIFICANT CHANGE UP (ref 22–29)
CREAT SERPL-MCNC: 0.67 MG/DL — SIGNIFICANT CHANGE UP (ref 0.5–1.3)
EOSINOPHIL # BLD AUTO: 0.05 K/UL — SIGNIFICANT CHANGE UP (ref 0–0.5)
EOSINOPHIL NFR BLD AUTO: 0.8 % — SIGNIFICANT CHANGE UP (ref 0–6)
GLUCOSE BLDC GLUCOMTR-MCNC: 204 MG/DL — HIGH (ref 70–99)
GLUCOSE BLDC GLUCOMTR-MCNC: 216 MG/DL — HIGH (ref 70–99)
GLUCOSE SERPL-MCNC: 250 MG/DL — HIGH (ref 70–99)
HCT VFR BLD CALC: 46.1 % — SIGNIFICANT CHANGE UP (ref 39–50)
HGB BLD-MCNC: 15.5 G/DL — SIGNIFICANT CHANGE UP (ref 13–17)
IMM GRANULOCYTES NFR BLD AUTO: 0.3 % — SIGNIFICANT CHANGE UP (ref 0–1.5)
LYMPHOCYTES # BLD AUTO: 2.23 K/UL — SIGNIFICANT CHANGE UP (ref 1–3.3)
LYMPHOCYTES # BLD AUTO: 35.3 % — SIGNIFICANT CHANGE UP (ref 13–44)
MCHC RBC-ENTMCNC: 29.3 PG — SIGNIFICANT CHANGE UP (ref 27–34)
MCHC RBC-ENTMCNC: 33.6 GM/DL — SIGNIFICANT CHANGE UP (ref 32–36)
MCV RBC AUTO: 87.1 FL — SIGNIFICANT CHANGE UP (ref 80–100)
MONOCYTES # BLD AUTO: 0.44 K/UL — SIGNIFICANT CHANGE UP (ref 0–0.9)
MONOCYTES NFR BLD AUTO: 7 % — SIGNIFICANT CHANGE UP (ref 2–14)
NEUTROPHILS # BLD AUTO: 3.52 K/UL — SIGNIFICANT CHANGE UP (ref 1.8–7.4)
NEUTROPHILS NFR BLD AUTO: 55.8 % — SIGNIFICANT CHANGE UP (ref 43–77)
PLATELET # BLD AUTO: 204 K/UL — SIGNIFICANT CHANGE UP (ref 150–400)
POTASSIUM SERPL-MCNC: 4 MMOL/L — SIGNIFICANT CHANGE UP (ref 3.5–5.3)
POTASSIUM SERPL-SCNC: 4 MMOL/L — SIGNIFICANT CHANGE UP (ref 3.5–5.3)
PROT SERPL-MCNC: 7.5 G/DL — SIGNIFICANT CHANGE UP (ref 6.6–8.7)
RBC # BLD: 5.29 M/UL — SIGNIFICANT CHANGE UP (ref 4.2–5.8)
RBC # FLD: 12 % — SIGNIFICANT CHANGE UP (ref 10.3–14.5)
SODIUM SERPL-SCNC: 135 MMOL/L — SIGNIFICANT CHANGE UP (ref 135–145)
WBC # BLD: 6.31 K/UL — SIGNIFICANT CHANGE UP (ref 3.8–10.5)
WBC # FLD AUTO: 6.31 K/UL — SIGNIFICANT CHANGE UP (ref 3.8–10.5)

## 2021-09-09 PROCEDURE — 82803 BLOOD GASES ANY COMBINATION: CPT

## 2021-09-09 PROCEDURE — 84484 ASSAY OF TROPONIN QUANT: CPT

## 2021-09-09 PROCEDURE — 84132 ASSAY OF SERUM POTASSIUM: CPT

## 2021-09-09 PROCEDURE — 96360 HYDRATION IV INFUSION INIT: CPT

## 2021-09-09 PROCEDURE — 80053 COMPREHEN METABOLIC PANEL: CPT

## 2021-09-09 PROCEDURE — 82435 ASSAY OF BLOOD CHLORIDE: CPT

## 2021-09-09 PROCEDURE — 85025 COMPLETE CBC W/AUTO DIFF WBC: CPT

## 2021-09-09 PROCEDURE — 99283 EMERGENCY DEPT VISIT LOW MDM: CPT | Mod: 25

## 2021-09-09 PROCEDURE — 99217: CPT

## 2021-09-09 PROCEDURE — 81001 URINALYSIS AUTO W/SCOPE: CPT

## 2021-09-09 PROCEDURE — 82962 GLUCOSE BLOOD TEST: CPT

## 2021-09-09 PROCEDURE — 82947 ASSAY GLUCOSE BLOOD QUANT: CPT

## 2021-09-09 PROCEDURE — G0378: CPT

## 2021-09-09 PROCEDURE — 85018 HEMOGLOBIN: CPT

## 2021-09-09 PROCEDURE — 83605 ASSAY OF LACTIC ACID: CPT

## 2021-09-09 PROCEDURE — 87086 URINE CULTURE/COLONY COUNT: CPT

## 2021-09-09 PROCEDURE — 85014 HEMATOCRIT: CPT

## 2021-09-09 PROCEDURE — 93005 ELECTROCARDIOGRAM TRACING: CPT

## 2021-09-09 PROCEDURE — 82330 ASSAY OF CALCIUM: CPT

## 2021-09-09 PROCEDURE — 36415 COLL VENOUS BLD VENIPUNCTURE: CPT

## 2021-09-09 PROCEDURE — 82009 KETONE BODYS QUAL: CPT

## 2021-09-09 PROCEDURE — 84295 ASSAY OF SERUM SODIUM: CPT

## 2021-09-09 PROCEDURE — 83036 HEMOGLOBIN GLYCOSYLATED A1C: CPT

## 2021-09-09 RX ORDER — INSULIN LISPRO 100/ML
15 VIAL (ML) SUBCUTANEOUS
Qty: 17 | Refills: 0
Start: 2021-09-09 | End: 2021-10-08

## 2021-09-09 RX ORDER — INSULIN GLARGINE 100 [IU]/ML
40 INJECTION, SOLUTION SUBCUTANEOUS AT BEDTIME
Refills: 0 | Status: DISCONTINUED | OUTPATIENT
Start: 2021-09-09 | End: 2021-09-12

## 2021-09-09 RX ORDER — ENOXAPARIN SODIUM 100 MG/ML
40 INJECTION SUBCUTANEOUS
Qty: 25 | Refills: 0
Start: 2021-09-09 | End: 2021-10-08

## 2021-09-09 RX ORDER — INSULIN LISPRO 100/ML
15 VIAL (ML) SUBCUTANEOUS
Refills: 0 | Status: DISCONTINUED | OUTPATIENT
Start: 2021-09-09 | End: 2021-09-12

## 2021-09-09 RX ADMIN — Medication 10 UNIT(S): at 08:14

## 2021-09-09 RX ADMIN — Medication 4: at 08:14

## 2021-09-09 RX ADMIN — PANTOPRAZOLE SODIUM 40 MILLIGRAM(S): 20 TABLET, DELAYED RELEASE ORAL at 06:10

## 2021-09-09 NOTE — ED ADULT NURSE REASSESSMENT NOTE - NS ED NURSE REASSESS COMMENT FT1
Patient does not want to stay for lunch. Daughter Edel at the bedside. Patient and daughter made aware next insulin dose is before lunch time, states will take it at home.

## 2021-09-09 NOTE — ED CDU PROVIDER SUBSEQUENT DAY NOTE - HISTORY
no acute events overnight, meds changed to Lantus 30 units at bedtime, 10 units admelog prior to meals plus moderate sliding scale. pt with appt with NP Helio on Tuesday 9/28/21 at 8:00am, made by case management, continue with DM education overnight
no acute overnight events will need diabetes education this morning

## 2021-09-09 NOTE — ED CDU PROVIDER SUBSEQUENT DAY NOTE - PHYSICAL EXAMINATION
Gen: NAD, AOx3  Head: NCAT  Lung: CTAB, no respiratory distress, no wheezing, rales, rhonchi  CV: normal s1/s2, rrr, no murmurs, Normal perfusion  Abd: soft, NTND  MSK: No edema, no visible deformities, full range of motion in all 4 extremities  Neuro: No focal neurologic deficits  Skin: No rash   Psych: normal affect
Gen: NAD, AOx3  Head: NCAT  Lung: CTAB, no respiratory distress, no wheezing, rales, rhonchi  CV: normal s1/s2, rrr, no murmurs, Normal perfusion  Abd: soft, NTND  MSK: No edema, no visible deformities, full range of motion in all 4 extremities  Neuro: No focal neurologic deficits  Skin: No rash   Psych: normal affect

## 2021-09-09 NOTE — ED CDU PROVIDER SUBSEQUENT DAY NOTE - SEVERE SEPSIS CRITERIA MET YN (MLM)
Belle with [employer] calling     She states has severe cough that has now caused chest pains.    Call connected with nurse.  
Sepsis Criteria were met:
Sepsis Criteria were met:

## 2021-09-09 NOTE — ED CDU PROVIDER DISPOSITION NOTE - ATTENDING CONTRIBUTION TO CARE
placed on observation for blood glucose management.  adjustments made to regimen.  Recommendations at discharge include Lantus 40units at bedtime, Ademolog 15 units premeal WITH ADDITIONAL sliding scale insulin based upon fingerstick glucose measurement.  Patient to follow-up with endocrinology for ongoing management.  Diabetes education and insulin treatment education procided by nursing.

## 2021-09-09 NOTE — ED ADULT NURSE REASSESSMENT NOTE - NS ED NURSE REASSESS COMMENT FT1
Assumed care of the patient at 0730. Verbal report received from [facility]. Patient A&Ox4. No s/s of acute distress or pain. Patient demonstrated correct use of glucometer and self injecting correct amount of insulin at correct site with supervision of RN. Ambulatory with steady gait. Patient pending DM educator. Patient in understanding of plan of care. Patient with no further questions for the RN. Resting in comfort. Call bell within reach and encouraged to use when assistance needed. Will continue to monitor. Assumed care of the patient at 0730. Verbal report received from [facility]. Patient A&Ox4. No s/s of acute distress or pain. Patient demonstrated correct use of glucometer and self injecting correct amount of insulin at correct site with supervision of RN. IVF infusing as per orders. Ambulatory with steady gait. Patient pending DM educator. Patient in understanding of plan of care. Patient with no further questions for the RN. Resting in comfort. Call bell within reach and encouraged to use when assistance needed. Will continue to monitor.

## 2021-09-09 NOTE — ED CDU PROVIDER SUBSEQUENT DAY NOTE - NSICDXFAMILYHX_GEN_ALL_CORE_FT
FAMILY HISTORY:  Sibling  Still living? Unknown  FH: diabetes mellitus, Age at diagnosis: Age Unknown    
FAMILY HISTORY:  Sibling  Still living? Unknown  FH: diabetes mellitus, Age at diagnosis: Age Unknown

## 2021-09-09 NOTE — ED CDU PROVIDER SUBSEQUENT DAY NOTE - CONDUCTED A DETAILED DISCUSSION WITH PATIENT AND/OR GUARDIAN REGARDING, MDM
lab results/radiology results/need for outpatient follow-up/return to ED if symptoms worsen, persist or questions arise
lab results/radiology results/need for outpatient follow-up/return to ED if symptoms worsen, persist or questions arise

## 2021-09-09 NOTE — ED CDU PROVIDER SUBSEQUENT DAY NOTE - ATTENDING CONTRIBUTION TO CARE
seen on morning rounds: offers no complaints.  Glucose prior to evening lantus (30 units)  269.  Pre-breakfast glucose 216 (prescribed ademolog 10 units WITH sliding scale).  Management discussed with diabetic educator who recommends increasing evening Lantus to 40 units and premeal insulin (ademolog) to 15 units WITH sliding scale coverage.  Patient has equipment/ supplies for fingerstick glucose measurement.  Will send rx for insulin to patients preferred pharmacy.

## 2021-09-09 NOTE — ED ADULT NURSE REASSESSMENT NOTE - NS ED NURSE REASSESS COMMENT FT1
Patient is alert and oriented X4 from home. Patient has been resting all night and has been sleeping on and off. Patient was educated on taking his blood sugar as well as giving himself Lantus. pt educated on plan of care, pt able to successfully teach back plan of care to RN, RN will continue to reeducate pt during hospital stay.

## 2021-09-09 NOTE — ED ADULT NURSE REASSESSMENT NOTE - NS ED NURSE REASSESS COMMENT FT1
Patient demonstrated correct use of insulin pen using "diabetic demonstration kit" with supervision of RN.  Lelo at the bedside for translation.

## 2021-09-09 NOTE — ED CDU PROVIDER SUBSEQUENT DAY NOTE - NSICDXPASTMEDICALHX_GEN_ALL_CORE_FT
PAST MEDICAL HISTORY:  HLD (hyperlipidemia)     Type 2 diabetes mellitus     
PAST MEDICAL HISTORY:  HLD (hyperlipidemia)     Type 2 diabetes mellitus

## 2021-09-09 NOTE — ED ADULT NURSE REASSESSMENT NOTE - URINE COLOR
Problem: Activity Intolerance  Goal: # Functional status is maintained or returned to baseline  Outcome: Outcome Met, Continue evaluating goal progress toward completion  Patient ambulated to/from nursing station with SBA/independent    Problem: At Risk for Falls  Goal: # Takes action to control fall-related risks  Outcome: Outcome Met, Continue evaluating goal progress toward completion  Patient puts on red  socks prior to walking in hallway       yellow

## 2021-09-09 NOTE — ED CDU PROVIDER DISPOSITION NOTE - CLINICAL COURSE
56 year old male with PMHx DM presented to the ED for hyperglycemia. Pt reports he went to his PMD, had bgl of 580 and was sent to the ED. Pt with 20 pound weight loss over the last month associated with polydipsia and polyuria. Pt placed in obs for bgl control, Lantus started at 30, increased to 40, admelog started at 12 before meals increased to 15units before meals 3 times a day. Pt feeling well at this time. Reporting no symptoms. Meds sent to pts pharmacy. Pt with endo follow up Sep 22nd. Return precautions discussed.

## 2021-09-27 ENCOUNTER — RESULT CHARGE (OUTPATIENT)
Age: 56
End: 2021-09-27

## 2021-09-27 ENCOUNTER — APPOINTMENT (OUTPATIENT)
Dept: INTERNAL MEDICINE | Facility: CLINIC | Age: 56
End: 2021-09-27
Payer: COMMERCIAL

## 2021-09-27 VITALS
BODY MASS INDEX: 31.41 KG/M2 | OXYGEN SATURATION: 95 % | HEART RATE: 80 BPM | TEMPERATURE: 98.3 F | DIASTOLIC BLOOD PRESSURE: 68 MMHG | HEIGHT: 64 IN | SYSTOLIC BLOOD PRESSURE: 108 MMHG | WEIGHT: 184 LBS | RESPIRATION RATE: 16 BRPM

## 2021-09-27 DIAGNOSIS — R53.83 OTHER FATIGUE: ICD-10-CM

## 2021-09-27 PROCEDURE — G0008: CPT

## 2021-09-27 PROCEDURE — 90686 IIV4 VACC NO PRSV 0.5 ML IM: CPT

## 2021-09-27 PROCEDURE — 99214 OFFICE O/P EST MOD 30 MIN: CPT | Mod: 25

## 2021-09-27 PROCEDURE — 82962 GLUCOSE BLOOD TEST: CPT

## 2021-09-27 RX ORDER — MUPIROCIN 20 MG/G
2 OINTMENT TOPICAL
Qty: 1 | Refills: 0 | Status: DISCONTINUED | COMMUNITY
Start: 2020-05-26 | End: 2021-09-27

## 2021-09-27 RX ORDER — DICLOFENAC SODIUM 10 MG/G
1 GEL TOPICAL
Qty: 1 | Refills: 0 | Status: DISCONTINUED | COMMUNITY
Start: 2020-01-07 | End: 2021-09-27

## 2021-09-27 RX ORDER — METFORMIN HYDROCHLORIDE 500 MG/1
500 TABLET, COATED ORAL
Qty: 180 | Refills: 0 | Status: DISCONTINUED | COMMUNITY
Start: 2020-11-30 | End: 2021-09-27

## 2021-09-27 RX ORDER — MUPIROCIN 20 MG/G
2 OINTMENT TOPICAL
Qty: 1 | Refills: 0 | Status: DISCONTINUED | COMMUNITY
Start: 2020-11-18 | End: 2021-09-27

## 2021-09-28 ENCOUNTER — APPOINTMENT (OUTPATIENT)
Dept: ENDOCRINOLOGY | Facility: CLINIC | Age: 56
End: 2021-09-28
Payer: COMMERCIAL

## 2021-09-28 VITALS — BODY MASS INDEX: 31.07 KG/M2 | WEIGHT: 182 LBS | HEIGHT: 64 IN

## 2021-09-28 VITALS — DIASTOLIC BLOOD PRESSURE: 78 MMHG | SYSTOLIC BLOOD PRESSURE: 112 MMHG | HEART RATE: 88 BPM | OXYGEN SATURATION: 97 %

## 2021-09-28 LAB — GLUCOSE BLDC GLUCOMTR-MCNC: 148

## 2021-09-28 PROCEDURE — 99204 OFFICE O/P NEW MOD 45 MIN: CPT | Mod: 25

## 2021-09-28 PROCEDURE — 82962 GLUCOSE BLOOD TEST: CPT

## 2021-09-28 RX ORDER — INSULIN LISPRO 100 U/ML
100 INJECTION, SOLUTION INTRAVENOUS; SUBCUTANEOUS
Refills: 0 | Status: DISCONTINUED | COMMUNITY
Start: 2021-09-27 | End: 2021-09-28

## 2021-09-28 NOTE — PHYSICAL EXAM
[Alert] : alert [Normal Sclera/Conjunctiva] : normal sclera/conjunctiva [Normal Outer Ear/Nose] : the ears and nose were normal in appearance [No Neck Mass] : no neck mass was observed [Thyroid Not Enlarged] : the thyroid was not enlarged [No Respiratory Distress] : no respiratory distress [Clear to Auscultation] : lungs were clear to auscultation bilaterally [Normal Rate] : heart rate was normal [No Stigmata of Cushings Syndrome] : no stigmata of Cushings Syndrome [Normal Gait] : normal gait [Oriented x3] : oriented to person, place, and time

## 2021-09-28 NOTE — ASSESSMENT
[FreeTextEntry1] : T2DM\par -Reviewed risk/complication of uncontrolled DM \par -Increase exercise as tolerated 5 days a week x 30 mins/day\par -Increase dietary efforts, low carb/low sugar\par -Continue to check FS 4 times a day and keep log, bring log to next appt\par -Continue Lantus 40 units QHS\par -Decrease Humalog to 12 units before meals, since changing diet, if FS <80, skip humalog\par -Education on rotating injection sites \par -Follow  up with specialist including optho\par -CDE appt ASAP \par \par RTO CDE ASAP\par RTO NP 4 weeks

## 2021-09-28 NOTE — HISTORY OF PRESENT ILLNESS
[FreeTextEntry1] : Patient was recently in [facility] ED from 9/7/21-9/9/21, he was not admitted. 55yo M hx of DM HLD pw hyperglycemia. notes went to\par his pmd for a regular visit and his sugar in the office was 580 so was sent to the ED. notes 20lbs weight loss over last month, with polydypsia/polyuriam feeling weak.\par \par Co-Worker present to translate \par \par Quality: T2DM\par Severity: uncontrolled\par Duration: 2 years \par Onset:pre-diabetes\par Modifying Factors: metformin, now insulin\par \par SMBG\par -checks 4 times a day \par -Average: 120-130\par Throughout day: 160-170s\par Before bed usually around 130-150\par \par Current  \par HgbA1C: 12.7% on 9/7/21\par \par Current Regimen:\par Lantus - 40 units QHS\par Humalog 15 units before meals \par He  skipped 3 times due to sugar being around 68-70, felt dizzy \par \par Eye Exam: 10/2021 has appt \par Foot Exam: denies numbness and tingling in feet \par Kidney Disease: none\par Heart Disease: none \par \par Weight: lost 20 pounds when sugar was high and now comes back \par Diet: has changed diet since being in hospital, decreasing rice and portion sizes \par B- chicken, eggs\par L- salad, grilled chicken\par D- chicken, rice, beans\par Snack- cashews, green apple\par Drink- black coffee, water \par Exercise:everyday \par Smoking: none

## 2021-09-28 NOTE — REVIEW OF SYSTEMS
[Fatigue] : fatigue [Recent Weight Loss (___ Lbs)] : recent weight loss: [unfilled] lbs [Visual Field Defect] : visual field defect [Blurred Vision] : blurred vision [Dysphagia] : no dysphagia [Chest Pain] : no chest pain [Palpitations] : no palpitations [Shortness Of Breath] : no shortness of breath [Nausea] : no nausea [Constipation] : no constipation [Vomiting] : no vomiting [Diarrhea] : no diarrhea [Polyuria] : no polyuria [Polydipsia] : no polydipsia

## 2021-10-01 NOTE — PHYSICAL EXAM
[No Acute Distress] : no acute distress [Well Nourished] : well nourished [Well Developed] : well developed [Well-Appearing] : well-appearing [Normal Voice/Communication] : normal voice/communication [Normal Sclera/Conjunctiva] : normal sclera/conjunctiva [PERRL] : pupils equal round and reactive to light [Normal Oropharynx] : the oropharynx was normal [No JVD] : no jugular venous distention [No Lymphadenopathy] : no lymphadenopathy [Supple] : supple [Thyroid Normal, No Nodules] : the thyroid was normal and there were no nodules present [No Respiratory Distress] : no respiratory distress  [No Accessory Muscle Use] : no accessory muscle use [Clear to Auscultation] : lungs were clear to auscultation bilaterally [Normal Rate] : normal rate  [Regular Rhythm] : with a regular rhythm [Normal S1, S2] : normal S1 and S2 [No Murmur] : no murmur heard [No Edema] : there was no peripheral edema [Soft] : abdomen soft [Non Tender] : non-tender [Non-distended] : non-distended [No Masses] : no abdominal mass palpated [No HSM] : no HSM [Normal Bowel Sounds] : normal bowel sounds [No Focal Deficits] : no focal deficits [Normal Affect] : the affect was normal [Alert and Oriented x3] : oriented to person, place, and time [Normal Mood] : the mood was normal [Normal Insight/Judgement] : insight and judgment were intact [No Rash] : no rash [de-identified] : VERA [de-identified] : No calf tenderness

## 2021-10-01 NOTE — HISTORY OF PRESENT ILLNESS
[de-identified] : Here today for follow-up after recent ER visit for uncontrolled hyperglycemia/diabetes\par \par He states he currently feels well but states he sometimes feels sleepy and forgetful.  He denies headaches, fever/chills, chest pain, shortness of breath, palpitations, abdominal pain, nausea, vomiting, diarrhea.  He denies feeling depressed or anxious\par \par He was discharged from the hospital on Lantus 40 units once daily and states he has been taking Ademlog  insulin 14 units prior to meals 3 times a day.  He states this morning his fingerstick was 130.  He took ademlog 14 units. He states he ate breakfast.  He states that approximately 11 AM his glucose was 68 so he skipped his Ademlog.  He states usually at dinnertime his glucoses run in the 130s to 140s\par \par He states he has appointment to see endocrinology tomorrow

## 2021-10-01 NOTE — ASSESSMENT
[FreeTextEntry1] : \par Uncontrolled diabetes\par -Glucose fingerstick today 180\par -Hemoglobin A1c 12.7 2021\par -He is currently on Lantus 40 units once daily\par -He is currently on Ademlog 14 units prior to breakfast, lunch, dinner-mild hypoglycemia noted prior to lunch.  I advised he decrease prebreakfast Ademlog to 8 units but advised he speak to his endocrinologist at appointment tomorrow regarding insulin regimen\par -On atorvastatin-check lipids\par -Foot exam 3/3/2021\par -ophthalmology: 10/2020 normal exam per pt-I have advised that he see ophthalmology for annual exam: He states he has appointment\par \par Dyslipidemia\par -on atorvastatin\par -Check fasting labs\par \par Obesity:\par -BMI 31\par -risks of obesity discussed\par -benefits of weight loss discussed\par -low fat/low chol diet and low carbohydrate diet advised\par -small portion sizes encouraged\par -I advised avoidance of sugary drinks\par -aerobic exercise encouraged\par -weight loss advised\par \par \par Forgetfulness/feels sleepy/fatigue sometimes\par -Check labs\par -If symptoms persist may consider sleep study\par \par GERD:\par -pantoprazole prn\par \par Vitamin D Deficiency:\par -Check vitamin D level\par \par HCM:\par \par CPE: 3/3/2021\par \par EK20\par \par Influenza vaccine: advised.  R/B discussed.  No egg allergy reported.  VIS given.  Flu shot given today 2021\par \par Tdap: 2014\par \par Pneumovax:  10/2/2019\par \par Covid vaccine: Pfizer\par \par HIV test: 2020 negative\par \par Hepatitis C screenin2016\par \par Depression screening: 3/3/2021-PHQ 2 score 0 negative\par \par Colonoscopy: 2020 tubular adenoma-repeat 5 years advised\par \par FIT testin2018 negative\par \par PSA 0.6 3/2021\par \par \par \par F/U 6 to 8 weeks.  Fasting labs ordered and he will have done at the lab

## 2021-10-04 ENCOUNTER — APPOINTMENT (OUTPATIENT)
Dept: ENDOCRINOLOGY | Facility: CLINIC | Age: 56
End: 2021-10-04
Payer: COMMERCIAL

## 2021-10-04 PROCEDURE — G0108 DIAB MANAGE TRN  PER INDIV: CPT

## 2021-10-08 NOTE — ASSESSMENT
[FreeTextEntry1] : \par Acute URI/[pharyngitis:\par -I counseled pt to not take meds or buys meds that are not prescribed by a doctor\par -will tx with Z-jerry\par -I advised rest and fluids\par -he is to notify office if sx persist or worsen\par \par GERD:\par -he uses pantoprazole prn\par -seen by GI and plan is for EGD\par \par DM: last HgA1c 6.8-will check labs\par -now on metfromin 500mg PO daily\par -he has lost 8 lbs through diet and exercise\par -will check lipid and urine A:C\par -Foot exam 2019\par -ophthalmology referred today\par \par Obesity:\par -he has lost 8 lbs through diet and exercise\par -I adv low fat/low chol diet and weight loss\par \par Dyslipidemia\par -now on atorvastatin\par -I adv low fat/low chol diet and weight loss\par -will check fasting labs\par \par Vitamin D Deficiency:\par -will check vitamin D 25-OH level\par \par Foot dermatitis:\par -resolved\par \par Atypical nevus: right shoulder\par -referred to dermatology as he may need excision and bx given dark color-referred again today\par \par HCM:\par \par CPE: 2019\par \par [id number]-NSR at 70, no ST abnormalities\par \par Influenza vaccine: 2018\par \par Tdap: 2014\par \par Pneumovax: advised when he is feeling better\par \par HIV test: 2018 negative\par \par Hepatitis C screenin2016\par \par Depression screenin2019 negative\par \par Colonoscopy:  sen by GI and colonoscopy is planned\par \par FIT testin2018 negative\par \par PSA 0.77 2018\par \par \par F/U 3 months for DM f/u
98.8

## 2021-10-13 ENCOUNTER — NON-APPOINTMENT (OUTPATIENT)
Age: 56
End: 2021-10-13

## 2021-10-25 ENCOUNTER — APPOINTMENT (OUTPATIENT)
Dept: ENDOCRINOLOGY | Facility: CLINIC | Age: 56
End: 2021-10-25
Payer: COMMERCIAL

## 2021-10-25 PROCEDURE — G0108 DIAB MANAGE TRN  PER INDIV: CPT

## 2021-11-01 ENCOUNTER — APPOINTMENT (OUTPATIENT)
Dept: ENDOCRINOLOGY | Facility: CLINIC | Age: 56
End: 2021-11-01

## 2021-12-13 ENCOUNTER — APPOINTMENT (OUTPATIENT)
Dept: INTERNAL MEDICINE | Facility: CLINIC | Age: 56
End: 2021-12-13
Payer: COMMERCIAL

## 2021-12-13 VITALS
HEIGHT: 64 IN | BODY MASS INDEX: 53.78 KG/M2 | SYSTOLIC BLOOD PRESSURE: 124 MMHG | DIASTOLIC BLOOD PRESSURE: 76 MMHG | TEMPERATURE: 98 F | OXYGEN SATURATION: 96 % | HEART RATE: 78 BPM | WEIGHT: 315 LBS | RESPIRATION RATE: 15 BRPM

## 2021-12-13 PROCEDURE — 99213 OFFICE O/P EST LOW 20 MIN: CPT | Mod: 25

## 2021-12-13 PROCEDURE — 82962 GLUCOSE BLOOD TEST: CPT

## 2021-12-13 NOTE — HEALTH RISK ASSESSMENT
[0] : 2) Feeling down, depressed, or hopeless: Not at all (0) [PHQ-2 Negative - No further assessment needed] : PHQ-2 Negative - No further assessment needed [[id number]] : 0

## 2021-12-13 NOTE — HISTORY OF PRESENT ILLNESS
[de-identified] : Here for follow up of DM\par \par he states he is trying to watch his diet\par \par He states FS 4 days ago was 130\par \par he states he feels well and denies any complaints

## 2021-12-13 NOTE — REVIEW OF SYSTEMS
[Negative] : Psychiatric [Fever] : no fever [Chills] : no chills [Fatigue] : no fatigue [Recent Change In Weight] : ~T no recent weight change [Chest Pain] : no chest pain [Palpitations] : no palpitations [Lower Ext Edema] : no lower extremity edema [Shortness Of Breath] : no shortness of breath [Wheezing] : no wheezing [Cough] : no cough [Dyspnea on Exertion] : not dyspnea on exertion [Abdominal Pain] : no abdominal pain [Nausea] : no nausea [Diarrhea] : no diarrhea [Vomiting] : no vomiting [Anxiety] : no anxiety [Depression] : no depression

## 2021-12-13 NOTE — PHYSICAL EXAM
[No Acute Distress] : no acute distress [Well Nourished] : well nourished [Well Developed] : well developed [Well-Appearing] : well-appearing [Normal Voice/Communication] : normal voice/communication [Normal Sclera/Conjunctiva] : normal sclera/conjunctiva [PERRL] : pupils equal round and reactive to light [Normal Oropharynx] : the oropharynx was normal [No JVD] : no jugular venous distention [No Lymphadenopathy] : no lymphadenopathy [Supple] : supple [Thyroid Normal, No Nodules] : the thyroid was normal and there were no nodules present [No Respiratory Distress] : no respiratory distress  [No Accessory Muscle Use] : no accessory muscle use [Clear to Auscultation] : lungs were clear to auscultation bilaterally [Normal Rate] : normal rate  [Regular Rhythm] : with a regular rhythm [Normal S1, S2] : normal S1 and S2 [No Murmur] : no murmur heard [No Edema] : there was no peripheral edema [Soft] : abdomen soft [Non Tender] : non-tender [Non-distended] : non-distended [No Masses] : no abdominal mass palpated [No HSM] : no HSM [Normal Bowel Sounds] : normal bowel sounds [No Rash] : no rash [Normal Affect] : the affect was normal [Alert and Oriented x3] : oriented to person, place, and time [Normal Mood] : the mood was normal [Normal Insight/Judgement] : insight and judgment were intact

## 2021-12-13 NOTE — ASSESSMENT
[FreeTextEntry1] : \par Uncontrolled diabetes\par -he is followed by Endocrine\par -was seen by DM educator/nutritionist\par -Glucose fingerstick today 139 (not fasting)\par -Hemoglobin A1c 10.3 10/2021\par -He is currently on Lantus 20 units once daily\par -he is now on metformin 500mg ER 2 tabs PO BID\par -On atorvastatin-check lipids\par -Foot exam 3/3/2021\par -ophthalmology: 10/2020 normal exam per pt-I have advised that he see ophthalmology for annual exam: He states he has appointment\par -check fasting labs again in 4 weeks\par \par Dyslipidemia\par -on atorvastatin\par -Check fasting labs\par \par Obesity:\par -low fat/low chol diet and low carbohydrate diet advised\par -small portion sizes encouraged\par -I advised avoidance of sugary drinks\par -aerobic exercise encouraged\par -weight loss advised\par \par GERD:\par -pantoprazole prn\par \par Vitamin D Deficiency:\par -advised vitamin D3 1000 units daily\par \par HCM:\par \par CPE: 3/3/2021\par \par EK20\par \par Influenza vaccine:2021\par \par Tdap: 2014\par \par Pneumovax:  10/2/2019\par \par Covid vaccine: Pfizer-covid booster advised\par \par HIV test: 2020 negative\par \par Hepatitis C screenin2016\par \par Depression screenin2021-PHQ 2 score 0 negative\par \par Colonoscopy: 2020 tubular adenoma-repeat 5 years advised\par \par FIT testin2018 negative\par \par PSA 0.6 3/2021\par \par F/U 3 months. he will have fasting labs ordered in 4 weeks

## 2022-01-18 ENCOUNTER — APPOINTMENT (OUTPATIENT)
Dept: INTERNAL MEDICINE | Facility: CLINIC | Age: 57
End: 2022-01-18
Payer: COMMERCIAL

## 2022-01-18 VITALS
HEART RATE: 79 BPM | SYSTOLIC BLOOD PRESSURE: 130 MMHG | WEIGHT: 184 LBS | OXYGEN SATURATION: 99 % | HEIGHT: 64 IN | DIASTOLIC BLOOD PRESSURE: 90 MMHG | TEMPERATURE: 97.2 F | RESPIRATION RATE: 15 BRPM | BODY MASS INDEX: 31.41 KG/M2

## 2022-01-18 DIAGNOSIS — E66.9 OBESITY, UNSPECIFIED: ICD-10-CM

## 2022-01-18 DIAGNOSIS — L01.00 IMPETIGO, UNSPECIFIED: ICD-10-CM

## 2022-01-18 DIAGNOSIS — R23.4 CHANGES IN SKIN TEXTURE: ICD-10-CM

## 2022-01-18 DIAGNOSIS — E11.65 TYPE 2 DIABETES MELLITUS WITH HYPERGLYCEMIA: ICD-10-CM

## 2022-01-18 PROCEDURE — 99214 OFFICE O/P EST MOD 30 MIN: CPT | Mod: 25

## 2022-01-18 PROCEDURE — 36415 COLL VENOUS BLD VENIPUNCTURE: CPT

## 2022-01-18 NOTE — PHYSICAL EXAM
[No Acute Distress] : no acute distress [Well Nourished] : well nourished [Well Developed] : well developed [Well-Appearing] : well-appearing [Normal Voice/Communication] : normal voice/communication [Normal Sclera/Conjunctiva] : normal sclera/conjunctiva [PERRL] : pupils equal round and reactive to light [Normal Oropharynx] : the oropharynx was normal [No JVD] : no jugular venous distention [No Lymphadenopathy] : no lymphadenopathy [Supple] : supple [Thyroid Normal, No Nodules] : the thyroid was normal and there were no nodules present [No Respiratory Distress] : no respiratory distress  [No Accessory Muscle Use] : no accessory muscle use [Clear to Auscultation] : lungs were clear to auscultation bilaterally [Normal Rate] : normal rate  [Regular Rhythm] : with a regular rhythm [Normal S1, S2] : normal S1 and S2 [No Murmur] : no murmur heard [No Edema] : there was no peripheral edema [No Rash] : no rash [Normal Affect] : the affect was normal [Alert and Oriented x3] : oriented to person, place, and time [Normal Mood] : the mood was normal [Normal Insight/Judgement] : insight and judgment were intact [No Focal Deficits] : no focal deficits [de-identified] : B/L nasal septum anterior with friable mucosa with yellowish crusting [de-identified] : right heel with a 1 cm linear fissure with no discharge or surrouniding erythema

## 2022-01-18 NOTE — REVIEW OF SYSTEMS
[Negative] : Psychiatric [Fever] : no fever [Chills] : no chills [Fatigue] : no fatigue [Recent Change In Weight] : ~T no recent weight change [Earache] : no earache [Nasal Discharge] : no nasal discharge [Sore Throat] : no sore throat [Chest Pain] : no chest pain [Palpitations] : no palpitations [Lower Ext Edema] : no lower extremity edema [Shortness Of Breath] : no shortness of breath [Wheezing] : no wheezing [Cough] : no cough [Dyspnea on Exertion] : not dyspnea on exertion [Abdominal Pain] : no abdominal pain [Nausea] : no nausea [Diarrhea] : no diarrhea [Vomiting] : no vomiting [Anxiety] : no anxiety [Depression] : no depression [FreeTextEntry4] : see HPI [de-identified] : see HPI

## 2022-01-18 NOTE — ASSESSMENT
[FreeTextEntry1] : \par Right heel fissure\par -can use bactroban ontment BID x 7 days\par -needs to use moisturizer to avoid dry skin\par -he is to notify office if he develops worsening pain, redness or discharge\par \par Nasal impetigo\par -will tx with bactroban oint BID x 7 days\par \par Uncontrolled diabetes\par -he is followed by Endocrine\par -was seen by DM educator/nutritionist\par -Hemoglobin A1c 10.3 10/2021\par -on Lantus 20 units once daily and metformin 500mg ER 2 tabs PO BID\par -On atorvastatin-check lipids\par -Foot exam 3/3/2021\par -ophthalmology: 10/2020 normal exam per pt-I have advised that he see ophthalmology for annual exam: I again advised\par -check fasting labs \par \par Dyslipidemia\par -on atorvastatin\par -Check fasting labs\par \par Obesity:\par -low fat/low chol diet and low carbohydrate diet advised\par -small portion sizes encouraged\par -I advised avoidance of sugary drinks\par -aerobic exercise encouraged\par -weight loss advised\par \par GERD:\par -pantoprazole prn-will refill\par \par Vitamin D Deficiency:\par -advised vitamin D3 1000 units daily\par \par HCM:\par \par CPE: 3/3/2021\par \par EK20\par \par Influenza vaccine:2021\par \par Tdap: 2014\par \par Pneumovax:  10/2/2019\par \par Covid vaccine: Pfizerx 2 and Moderna booster\par \par HIV test: 2020 negative\par \par Hepatitis C screenin2016\par \par Depression screenin2021-PHQ 2 score 0 negative\par \par Colonoscopy: 2020 tubular adenoma-repeat 5 years advised\par \par FIT testin2018 negative\par \par PSA 0.6 3/2021\par \par F/U 3 months. Fasting labs drawn in office today

## 2022-01-18 NOTE — HISTORY OF PRESENT ILLNESS
[de-identified] : Here for medication refill\par \par He needs his cholesterol medication and GERD medication refilled\par \par He did not get a chance to do labs I ordered previously\par \par He reports the insides of B/L nostrils feels irritated.  he states in past he got an antibiotic ointment\par \par He also yomaira reports right heel has crack due to dry skin.  he states he has had it for about a week

## 2022-01-22 LAB
ALBUMIN SERPL ELPH-MCNC: 5 G/DL
ALP BLD-CCNC: 106 U/L
ALT SERPL-CCNC: 29 U/L
ANION GAP SERPL CALC-SCNC: 15 MMOL/L
AST SERPL-CCNC: 23 U/L
BILIRUB SERPL-MCNC: 0.3 MG/DL
BUN SERPL-MCNC: 12 MG/DL
CALCIUM SERPL-MCNC: 9.8 MG/DL
CHLORIDE SERPL-SCNC: 99 MMOL/L
CHOLEST SERPL-MCNC: 213 MG/DL
CO2 SERPL-SCNC: 23 MMOL/L
CREAT SERPL-MCNC: 0.9 MG/DL
ESTIMATED AVERAGE GLUCOSE: 154 MG/DL
GLUCOSE SERPL-MCNC: 126 MG/DL
HBA1C MFR BLD HPLC: 7 %
HDLC SERPL-MCNC: 34 MG/DL
LDLC SERPL CALC-MCNC: NORMAL MG/DL
NONHDLC SERPL-MCNC: 179 MG/DL
POTASSIUM SERPL-SCNC: 4.9 MMOL/L
PROT SERPL-MCNC: 8.2 G/DL
SODIUM SERPL-SCNC: 137 MMOL/L
TRIGL SERPL-MCNC: 666 MG/DL

## 2022-04-11 PROBLEM — Z11.59 SCREENING FOR VIRAL DISEASE: Status: ACTIVE | Noted: 2020-05-26

## 2022-04-12 ENCOUNTER — APPOINTMENT (OUTPATIENT)
Dept: INTERNAL MEDICINE | Facility: CLINIC | Age: 57
End: 2022-04-12
Payer: COMMERCIAL

## 2022-04-12 ENCOUNTER — NON-APPOINTMENT (OUTPATIENT)
Age: 57
End: 2022-04-12

## 2022-04-12 VITALS
WEIGHT: 183 LBS | TEMPERATURE: 97.8 F | BODY MASS INDEX: 31.24 KG/M2 | HEART RATE: 75 BPM | HEIGHT: 64 IN | OXYGEN SATURATION: 100 % | SYSTOLIC BLOOD PRESSURE: 126 MMHG | DIASTOLIC BLOOD PRESSURE: 80 MMHG | RESPIRATION RATE: 15 BRPM

## 2022-04-12 DIAGNOSIS — Z00.00 ENCOUNTER FOR GENERAL ADULT MEDICAL EXAMINATION W/OUT ABNORMAL FINDINGS: ICD-10-CM

## 2022-04-12 PROCEDURE — 96127 BRIEF EMOTIONAL/BEHAV ASSMT: CPT

## 2022-04-12 PROCEDURE — 99213 OFFICE O/P EST LOW 20 MIN: CPT | Mod: 25

## 2022-04-12 PROCEDURE — 93000 ELECTROCARDIOGRAM COMPLETE: CPT | Mod: 59

## 2022-04-12 PROCEDURE — 99396 PREV VISIT EST AGE 40-64: CPT | Mod: 25

## 2022-04-12 RX ORDER — INSULIN GLARGINE 100 [IU]/ML
100 INJECTION, SOLUTION SUBCUTANEOUS
Qty: 1 | Refills: 5 | Status: DISCONTINUED | COMMUNITY
Start: 2021-09-27 | End: 2022-04-12

## 2022-04-12 RX ORDER — MULTIVIT-MIN/FOLIC/VIT K/LYCOP 400-300MCG
25 MCG TABLET ORAL DAILY
Qty: 90 | Refills: 1 | Status: DISCONTINUED | COMMUNITY
Start: 2021-12-13 | End: 2022-04-12

## 2022-04-12 RX ORDER — INSULIN LISPRO 100 [IU]/ML
100 INJECTION, SOLUTION INTRAVENOUS; SUBCUTANEOUS
Qty: 1 | Refills: 2 | Status: DISCONTINUED | COMMUNITY
Start: 2021-09-09 | End: 2022-04-12

## 2022-04-12 RX ORDER — MUPIROCIN 20 MG/G
2 OINTMENT TOPICAL
Qty: 1 | Refills: 0 | Status: DISCONTINUED | COMMUNITY
Start: 2022-01-18 | End: 2022-04-12

## 2022-04-12 RX ORDER — INSULIN GLARGINE 100 [IU]/ML
100 INJECTION, SOLUTION SUBCUTANEOUS
Qty: 1 | Refills: 1 | Status: DISCONTINUED | COMMUNITY
Start: 2021-10-25 | End: 2022-04-12

## 2022-04-13 ENCOUNTER — RX CHANGE (OUTPATIENT)
Age: 57
End: 2022-04-13

## 2022-04-14 ENCOUNTER — RX CHANGE (OUTPATIENT)
Age: 57
End: 2022-04-14

## 2022-04-16 NOTE — HEALTH RISK ASSESSMENT
[Never] : Never [No] : In the past 12 months have you used drugs other than those required for medical reasons? No [0] : 2) Feeling down, depressed, or hopeless: Not at all (0) [PHQ-2 Negative - No further assessment needed] : PHQ-2 Negative - No further assessment needed [HIV Test offered] : HIV Test offered [Employed] : employed [[id number]] : 0 [FreeTextEntry2] : Karl

## 2022-04-16 NOTE — HISTORY OF PRESENT ILLNESS
[de-identified] : Here today for CPE\par \par He states he continues to get heartburn despite taking protonix.  he states sx worse after fried foods.  He reports occasional epigastric discomfort.  He denies nausea, vomiting, diarrhea, weight loss, fever/chills, rectal bleeding\par \par He reports that recently he has been feeling a little bit forgetful.  He denies feeling depressed or anxious.  He denies headaches or dizziness.\par \par He also reports that he has left foot dermatitis.  He states sometimes it is itchy.\par \par He states fingersticks at home are usually in the 120s.  He states this morning his fingerstick was 170 at home.  He states he stopped taking all insulins because he states it was making his sugars go too low.

## 2022-04-16 NOTE — ASSESSMENT
[FreeTextEntry1] : \par Left foot dermatitis\par -will treat with clotrimazole 1% cream to affected area twice daily x1 to 2 weeks\par -He is to notify office if symptoms persist or worsen\par \par Forgetfulness\par -He denies feeling depressed or anxious\par -Check labs\par -Symptoms appear mild at this time\par -If symptoms persist he may benefit from seeing neurology\par \par Uncontrolled diabetes\par -he is followed by Endocrine-I have advised he make follow-up appointment to see endocrine\par -Hemoglobin A1c 7.0 2022\par -He is currently on metformin 500mg ER 2 tabs PO BID\par -He states he stopped taking insulin as he felt it was making his blood sugars to go too low\par -On atorvastatin-check lipids\par -Foot exam 2022\par -ophthalmology: 10/2020 normal exam per pt-I have advised that he see ophthalmology for annual exam: I again advised-referral given for\par -Fasting labs ordered\par \par Dyslipidemia\par -His recent lipid panel on 2022 showed total cholesterol of 213 and triglycerides of 660\par -on atorvastatin\par -Check fasting labs\par \par Obesity:\par -low fat/low chol diet and low carbohydrate diet advised\par -small portion sizes encouraged\par -I advised avoidance of sugary drinks\par -aerobic exercise encouraged\par -weight loss advised\par \par GERD:\par -He reports he continues to have acid reflux despite using Protonix daily\par -He also reports occasional epigastric discomfort\par -Check labs\par -Check abdominal ultrasound\par -Referred to GI\par -I advise he continue taking Protonix 40 mg daily in the morning and will add famotidine 20 mg at bedtime\par -He is to avoid late night meals\par \par Vitamin D Deficiency:\par -vitamin D3 1000 units daily\par -Check labs\par \par Abnormal EKG\par -EKG today NSR at 71 with poor R wave progression\par -He denies any chest pain, shortness of breath, palpitations\par -I have advised that he see cardiology given abnormal EKG\par \par HCM:\par \par CPE: 2022\par \par EK2022-NSR at 71 with poor R wave progression\par \par Influenza vaccine:2021\par \par Tdap: 2014\par \par Pneumovax:  10/2/2019\par \par Covid vaccine: Pfizer x 2 and Moderna booster\par \par HIV test: 2020 negative-HIV testing offered 2022 and he consented to testing\par \par Hepatitis C screenin2016\par \par Depression screenin2022 PHQ 2 score 0 negative\par \par Colonoscopy: 2020 tubular adenoma-repeat 5 years advised\par \par PSA 0.6 3/2021-check PSA\par \par F/U 3 months. Fasting labs ordered and he will have drawn at the lab

## 2022-04-16 NOTE — REVIEW OF SYSTEMS
[Heartburn] : heartburn [Negative] : Musculoskeletal [Fever] : no fever [Chills] : no chills [Fatigue] : no fatigue [Recent Change In Weight] : ~T no recent weight change [Chest Pain] : no chest pain [Palpitations] : no palpitations [Lower Ext Edema] : no lower extremity edema [Shortness Of Breath] : no shortness of breath [Wheezing] : no wheezing [Cough] : no cough [Dyspnea on Exertion] : not dyspnea on exertion [Nausea] : no nausea [Diarrhea] : no diarrhea [Vomiting] : no vomiting [Anxiety] : no anxiety [Depression] : no depression [FreeTextEntry7] : see HPI [de-identified] : See HPI [de-identified] : See HPI

## 2022-04-16 NOTE — PHYSICAL EXAM
[No Acute Distress] : no acute distress [Well Nourished] : well nourished [Well Developed] : well developed [Well-Appearing] : well-appearing [Normal Voice/Communication] : normal voice/communication [Normal Sclera/Conjunctiva] : normal sclera/conjunctiva [PERRL] : pupils equal round and reactive to light [Normal Oropharynx] : the oropharynx was normal [No JVD] : no jugular venous distention [No Lymphadenopathy] : no lymphadenopathy [Supple] : supple [Thyroid Normal, No Nodules] : the thyroid was normal and there were no nodules present [No Respiratory Distress] : no respiratory distress  [No Accessory Muscle Use] : no accessory muscle use [Clear to Auscultation] : lungs were clear to auscultation bilaterally [Normal Rate] : normal rate  [Regular Rhythm] : with a regular rhythm [Normal S1, S2] : normal S1 and S2 [No Murmur] : no murmur heard [No Edema] : there was no peripheral edema [No Focal Deficits] : no focal deficits [Normal Affect] : the affect was normal [Alert and Oriented x3] : oriented to person, place, and time [Normal Mood] : the mood was normal [Normal Insight/Judgement] : insight and judgment were intact [EOMI] : extraocular movements intact [Normal TMs] : both tympanic membranes were normal [Normal Nasal Mucosa] : the nasal mucosa was normal [No Carotid Bruits] : no carotid bruits [Soft] : abdomen soft [Non Tender] : non-tender [Non-distended] : non-distended [No Masses] : no abdominal mass palpated [No HSM] : no HSM [Normal Bowel Sounds] : normal bowel sounds [No Spinal Tenderness] : no spinal tenderness [Comprehensive Foot Exam Normal] : Right and left foot were examined and both feet are normal. No ulcers in either foot. Toes are normal and with full ROM.  Normal tactile sensation with monofilament testing throughout both feet [de-identified] : No calf tenderness [de-identified] : Left foot with patchy erythematous dermatitis with whitish scaling along left medial ankle and arch of foot [de-identified] : +2 DP/PT pulses bilaterally-see skin exam

## 2022-04-19 ENCOUNTER — OUTPATIENT (OUTPATIENT)
Dept: OUTPATIENT SERVICES | Facility: HOSPITAL | Age: 57
LOS: 1 days | End: 2022-04-19
Payer: COMMERCIAL

## 2022-04-19 ENCOUNTER — APPOINTMENT (OUTPATIENT)
Dept: INTERNAL MEDICINE | Facility: CLINIC | Age: 57
End: 2022-04-19
Payer: COMMERCIAL

## 2022-04-19 ENCOUNTER — APPOINTMENT (OUTPATIENT)
Dept: ULTRASOUND IMAGING | Facility: CLINIC | Age: 57
End: 2022-04-19
Payer: COMMERCIAL

## 2022-04-19 DIAGNOSIS — Z00.8 ENCOUNTER FOR OTHER GENERAL EXAMINATION: ICD-10-CM

## 2022-04-19 PROCEDURE — 76700 US EXAM ABDOM COMPLETE: CPT

## 2022-04-19 PROCEDURE — 76700 US EXAM ABDOM COMPLETE: CPT | Mod: 26

## 2022-04-19 PROCEDURE — 99441: CPT

## 2022-04-19 NOTE — HISTORY OF PRESENT ILLNESS
[Home] : at home, [unfilled] , at the time of the visit. [Medical Office: ([facility])___] : at the medical office located in  [Verbal consent obtained from patient] : the patient, [unfilled] [FreeTextEntry8] : As this is telephone visit no physical exam could be performed.  Diagnosis and treatment based upon symptoms provided\par \kellen Hong scheduled telephonic visit today due to 5-day history of dry cough.  He thinks it is allergies.  He states he does not feel sick.  He denies sore throat, fever/chills, chest pain, shortness of breath, wheezing, nasal congestion.  He states he did have a mild headache but he states he took Tylenol and it went away.  He denies any sick contacts and has not had contact with anyone with COVID-19

## 2022-04-19 NOTE — REVIEW OF SYSTEMS
[Fever] : no fever [Chills] : no chills [Fatigue] : no fatigue [Earache] : no earache [Nasal Discharge] : no nasal discharge [Sore Throat] : no sore throat [Chest Pain] : no chest pain [Lower Ext Edema] : no lower extremity edema [Shortness Of Breath] : no shortness of breath [Wheezing] : no wheezing [Cough] : cough [Dyspnea on Exertion] : not dyspnea on exertion [Negative] : Gastrointestinal

## 2022-04-19 NOTE — ASSESSMENT
[FreeTextEntry1] : \par Dry cough\par -possibley due to allergies\par -he is requesting cough medication\par -will tx with claritin 10mg daily prn\par -will tx with tessalon 100mg TID prn\par -I did adv he have covid 19 test done at labs-ordered today\par -he is to notify office if sx persist or worsen\par \par Left foot dermatitis\par -has started to improved with ketoconazole cream BID\par \par Forgetfulness\par -He denies feeling depressed or anxious\par -he states he had labs drawn today\par -Symptoms appear mild at this time\par -If symptoms persist he may benefit from seeing neurology\par \par Uncontrolled diabetes\par -he is followed by Endocrine-I have advised he make follow-up appointment to see endocrine\par -Hemoglobin A1c 7.0 2022\par -He is currently on metformin 500mg ER 2 tabs PO BID\par -He states he stopped taking insulin as he felt it was making his blood sugars to go too low\par -On atorvastatin\par -Foot exam 2022\par -ophthalmology: 10/2020 normal exam per pt-referral given last visit\par -he states he had labs drawn today\par \par Dyslipidemia\par -on atorvastatin\par -he states he had labs drawn today\par \par \par GERD:\par -He also reports occasional epigastric discomfort\par -he states he had labs drawn today\par -abdominal ultrasound done today\par -Referred to GI last visit\par -now on Protonix 40 mg daily in the morning and famotidine 20 mg at bedtime and he states it has helped\par -He is to avoid late night meals\par \par Vitamin D Deficiency:\par -vitamin D3 1000 units daily\par -he states he had labs drawn today\par \par Abnormal EKG\par -EKG: NSR at 71 with poor R wave progression\par -He denies any chest pain, shortness of breath, palpitations\par -I have advised that he see cardiology given abnormal EKG\par \par HCM:\par \par CPE: 2022\par \par EK2022-NSR at 71 with poor R wave progression\par \par Influenza vaccine:2021\par \par Tdap: 2014\par \par Pneumovax:  10/2/2019\par \par Covid vaccine: Pfizer x 2 and Moderna booster\par \par HIV test: 2020 negative-HIV testing offered 2022 and he consented to testing\par \par Hepatitis C screenin2016\par \par Depression screenin2022 PHQ 2 score 0 negative\par \par Colonoscopy: 2020 tubular adenoma-repeat 5 years advised\par \par PSA 0.6 3/2021-check PSA\par \par F/U 3 months. Fasting ordered last visit and he states he went to lab this morning to have done.  he will notify office if cough persists or worsens

## 2022-04-22 ENCOUNTER — NON-APPOINTMENT (OUTPATIENT)
Age: 57
End: 2022-04-22

## 2022-05-02 ENCOUNTER — NON-APPOINTMENT (OUTPATIENT)
Age: 57
End: 2022-05-02

## 2022-05-03 ENCOUNTER — TRANSCRIPTION ENCOUNTER (OUTPATIENT)
Age: 57
End: 2022-05-03

## 2022-05-04 ENCOUNTER — RX CHANGE (OUTPATIENT)
Age: 57
End: 2022-05-04

## 2022-05-09 ENCOUNTER — RX CHANGE (OUTPATIENT)
Age: 57
End: 2022-05-09

## 2022-05-10 ENCOUNTER — NON-APPOINTMENT (OUTPATIENT)
Age: 57
End: 2022-05-10

## 2022-05-17 ENCOUNTER — APPOINTMENT (OUTPATIENT)
Dept: INTERNAL MEDICINE | Facility: CLINIC | Age: 57
End: 2022-05-17
Payer: COMMERCIAL

## 2022-05-17 VITALS
BODY MASS INDEX: 30.73 KG/M2 | TEMPERATURE: 97.9 F | DIASTOLIC BLOOD PRESSURE: 70 MMHG | SYSTOLIC BLOOD PRESSURE: 106 MMHG | RESPIRATION RATE: 16 BRPM | OXYGEN SATURATION: 99 % | HEART RATE: 70 BPM | WEIGHT: 180 LBS | HEIGHT: 64 IN

## 2022-05-17 PROCEDURE — 36415 COLL VENOUS BLD VENIPUNCTURE: CPT

## 2022-05-17 PROCEDURE — 99214 OFFICE O/P EST MOD 30 MIN: CPT | Mod: 25

## 2022-05-17 NOTE — HISTORY OF PRESENT ILLNESS
[FreeTextEntry8] : Here today with c/o B/L upper arm muscle pain x 2 weeks.  he denies any trauma or falls.  he denies any arm swelling.  he is not taking anything for his sx\par \par He also continues to have memory loss and states he feels forgetful.  he states he forgets things like where his phone is and stated he just forgets things in general.  he does not feel depressed or anxious

## 2022-05-17 NOTE — ASSESSMENT
[FreeTextEntry1] : \par B/L upper arm pain\par -given B/L upper arm muscle pain I have adv he hold atorvastatin x 2 weeks\par -check labs including CPK\par \par Forgetfulness/Memory loss\par -labs unrevealing\par -He denies feeling depressed or anxious\par -I have adv he see neurology\par \par DM type 2\par -he is followed by Endocrine-I have advised he make follow-up appointment to see endocrine\par -Hemoglobin A1c6.6 2022\par -metformin 500mg ER 2 tabs PO BID\par -On atorvastatin-LDL 40 2022\par -Foot exam 2022\par -urine A:C 66-I advised repeat urine A:C 2022 and if still abnormal may need ACEI\par -ophthalmology: 2022 normal exam per pt\par \par Dyslipidemia\par -on atorvastatin and LDL at goal\par -given B/L upper arm muscle pain I have adv he hold atorvastatin x 2 weeks\par -check labs including CPK\par \par GERD:\par -he states sx well controlled with protonix and famotidine-will refll\par -Referred to GI last visit\par \par Fatty liver on recent US\par -weight loss advised\par \par Vitamin D Deficiency:\par -2022 vitamin D 25-OH level was 25\par -I adv vitamin D3 1000 units daily\par \par Abnormal EKG\par -EKG previously: NSR at 71 with poor R wave progression\par -He denies any chest pain, shortness of breath, palpitations\par -I have advised that he see cardiology given abnormal EKG-I again advised today 2022\par \par HCM:\par \par CPE: 2022\par \par EK2022\par \par Influenza vaccine:2021\par \par Tdap: 2014\par \par Pneumovax:  10/2/2019\par \par Covid vaccine: Pfizer x 2 and Moderna booster\par \par HIV test: 2022 negative\par \par Hepatitis C screenin2016\par \par Depression screenin2022 PHQ 2 score 0 negative\par \par Colonoscopy: 2020 tubular adenoma-repeat 5 years advised\par \par PSA 0.53 2022\par \par F/U 3 months. labs drawn in office today

## 2022-05-17 NOTE — REVIEW OF SYSTEMS
[Negative] : Integumentary [Fever] : no fever [Chills] : no chills [Fatigue] : no fatigue [Chest Pain] : no chest pain [Palpitations] : no palpitations [Lower Ext Edema] : no lower extremity edema [Shortness Of Breath] : no shortness of breath [Wheezing] : no wheezing [Cough] : no cough [Dyspnea on Exertion] : not dyspnea on exertion [Abdominal Pain] : no abdominal pain [Nausea] : no nausea [Diarrhea] : no diarrhea [Vomiting] : no vomiting [Headache] : no headache [Dizziness] : no dizziness [FreeTextEntry9] : see HPI [de-identified] : see HPI

## 2022-05-18 ENCOUNTER — RX CHANGE (OUTPATIENT)
Age: 57
End: 2022-05-18

## 2022-05-19 ENCOUNTER — RX CHANGE (OUTPATIENT)
Age: 57
End: 2022-05-19

## 2022-05-20 ENCOUNTER — RX CHANGE (OUTPATIENT)
Age: 57
End: 2022-05-20

## 2022-05-23 LAB
ALBUMIN SERPL ELPH-MCNC: 5 G/DL
ALP BLD-CCNC: 70 U/L
ALT SERPL-CCNC: 24 U/L
ANION GAP SERPL CALC-SCNC: 13 MMOL/L
AST SERPL-CCNC: 22 U/L
BASOPHILS # BLD AUTO: 0.08 K/UL
BASOPHILS NFR BLD AUTO: 1.2 %
BILIRUB SERPL-MCNC: 0.6 MG/DL
BUN SERPL-MCNC: 20 MG/DL
CALCIUM SERPL-MCNC: 10.1 MG/DL
CHLORIDE SERPL-SCNC: 100 MMOL/L
CK SERPL-CCNC: 248 U/L
CO2 SERPL-SCNC: 23 MMOL/L
CREAT SERPL-MCNC: 0.78 MG/DL
EGFR: 105 ML/MIN/1.73M2
EOSINOPHIL # BLD AUTO: 0.12 K/UL
EOSINOPHIL NFR BLD AUTO: 1.8 %
ERYTHROCYTE [SEDIMENTATION RATE] IN BLOOD BY WESTERGREN METHOD: 8 MM/HR
GLUCOSE SERPL-MCNC: 131 MG/DL
HCT VFR BLD CALC: 45.2 %
HGB BLD-MCNC: 14.6 G/DL
IMM GRANULOCYTES NFR BLD AUTO: 0.3 %
LYMPHOCYTES # BLD AUTO: 2.83 K/UL
LYMPHOCYTES NFR BLD AUTO: 43.3 %
MAN DIFF?: NORMAL
MCHC RBC-ENTMCNC: 29.1 PG
MCHC RBC-ENTMCNC: 32.3 GM/DL
MCV RBC AUTO: 90.2 FL
MONOCYTES # BLD AUTO: 0.62 K/UL
MONOCYTES NFR BLD AUTO: 9.5 %
NEUTROPHILS # BLD AUTO: 2.86 K/UL
NEUTROPHILS NFR BLD AUTO: 43.9 %
PLATELET # BLD AUTO: 226 K/UL
POTASSIUM SERPL-SCNC: 4.1 MMOL/L
PROT SERPL-MCNC: 7.8 G/DL
RBC # BLD: 5.01 M/UL
RBC # FLD: 13 %
SODIUM SERPL-SCNC: 137 MMOL/L
WBC # FLD AUTO: 6.53 K/UL

## 2022-05-24 ENCOUNTER — APPOINTMENT (OUTPATIENT)
Dept: CARDIOLOGY | Facility: CLINIC | Age: 57
End: 2022-05-24
Payer: COMMERCIAL

## 2022-05-24 ENCOUNTER — NON-APPOINTMENT (OUTPATIENT)
Age: 57
End: 2022-05-24

## 2022-05-24 VITALS
HEIGHT: 64 IN | WEIGHT: 178 LBS | TEMPERATURE: 98.5 F | SYSTOLIC BLOOD PRESSURE: 102 MMHG | HEART RATE: 72 BPM | RESPIRATION RATE: 16 BRPM | DIASTOLIC BLOOD PRESSURE: 64 MMHG | BODY MASS INDEX: 30.39 KG/M2 | OXYGEN SATURATION: 97 %

## 2022-05-24 VITALS — SYSTOLIC BLOOD PRESSURE: 110 MMHG | DIASTOLIC BLOOD PRESSURE: 62 MMHG

## 2022-05-24 DIAGNOSIS — Z78.9 OTHER SPECIFIED HEALTH STATUS: ICD-10-CM

## 2022-05-24 DIAGNOSIS — M79.2 NEURALGIA AND NEURITIS, UNSPECIFIED: ICD-10-CM

## 2022-05-24 DIAGNOSIS — M79.601 PAIN IN RIGHT ARM: ICD-10-CM

## 2022-05-24 DIAGNOSIS — R51.9 HEADACHE, UNSPECIFIED: ICD-10-CM

## 2022-05-24 DIAGNOSIS — M79.602 PAIN IN RIGHT ARM: ICD-10-CM

## 2022-05-24 PROCEDURE — 93000 ELECTROCARDIOGRAM COMPLETE: CPT

## 2022-05-24 PROCEDURE — 99205 OFFICE O/P NEW HI 60 MIN: CPT

## 2022-05-24 RX ORDER — KETOCONAZOLE 20 MG/G
2 CREAM TOPICAL TWICE DAILY
Qty: 1 | Refills: 0 | Status: DISCONTINUED | COMMUNITY
Start: 2022-04-14 | End: 2022-05-24

## 2022-05-26 NOTE — DISCUSSION/SUMMARY
[Patient] : the patient [Risks] : risks [Benefits] : benefits [Alternatives] : alternatives [With Me] : with me [___ Month(s)] : in [unfilled] month(s) [FreeTextEntry1] : this is a 56 year old male with history of hypertension and dyslipidemia and Diabetes Mellitus , here with bilateral shoulder pain and abnormal EKG. \par \par 1) Abnormal EKG. coronary artery disease evaluation: intermediate risk factors for coronary artery disease.  echocardiogram with contrast if needed.   Nuclear stress test with IV technetium radiotracer. \par  2) hypertension : ct current meds.\par 3) dyslipidemia : ct statins\par 4) Will order and review ECG for the above mentioned diagnosis/condition/symptoms  az

## 2022-05-26 NOTE — REASON FOR VISIT
[Symptom and Test Evaluation] : symptom and test evaluation [Arrhythmia/ECG Abnorrmalities] : arrhythmia/ECG abnormalities [FreeTextEntry1] : shoulder pain bilaterally. \par

## 2022-05-26 NOTE — CARDIOLOGY SUMMARY
[de-identified] : 5 24 2022  Sinus  Rhythm \par -Poor R-wave progression -\par cannot rule out  old anterior infarct. \par

## 2022-05-26 NOTE — HISTORY OF PRESENT ILLNESS
[FreeTextEntry1] : shoulder pain bilaterally.\par \par \par this is a 56 year old male with hisiotry of hypertension and dyslipidemia and Diabetes Mellitus , here with bilateral shoulder pain and abnormnal EKG. \par he denies any chest pain . no dyspnea on exertion .\par he is complaint with meds.  shoulder pain improved . dull aching pain . \par \par \par family history L: no myocardial infarction.  in mother:\par father: no myocardial infarction  a\par \par No smoking. no alcohol. no drugs\par

## 2022-06-28 ENCOUNTER — APPOINTMENT (OUTPATIENT)
Dept: CARDIOLOGY | Facility: CLINIC | Age: 57
End: 2022-06-28

## 2022-06-28 PROCEDURE — 93015 CV STRESS TEST SUPVJ I&R: CPT

## 2022-06-28 PROCEDURE — 78452 HT MUSCLE IMAGE SPECT MULT: CPT

## 2022-06-28 PROCEDURE — A9500: CPT

## 2022-07-01 ENCOUNTER — TRANSCRIPTION ENCOUNTER (OUTPATIENT)
Age: 57
End: 2022-07-01

## 2022-07-12 ENCOUNTER — APPOINTMENT (OUTPATIENT)
Dept: INTERNAL MEDICINE | Facility: CLINIC | Age: 57
End: 2022-07-12

## 2022-07-12 ENCOUNTER — RX CHANGE (OUTPATIENT)
Age: 57
End: 2022-07-12

## 2022-07-12 VITALS
TEMPERATURE: 97.8 F | HEART RATE: 53 BPM | DIASTOLIC BLOOD PRESSURE: 70 MMHG | HEIGHT: 64 IN | RESPIRATION RATE: 16 BRPM | SYSTOLIC BLOOD PRESSURE: 120 MMHG | OXYGEN SATURATION: 98 % | WEIGHT: 180 LBS | BODY MASS INDEX: 30.73 KG/M2

## 2022-07-12 PROCEDURE — 36415 COLL VENOUS BLD VENIPUNCTURE: CPT

## 2022-07-12 PROCEDURE — 99214 OFFICE O/P EST MOD 30 MIN: CPT | Mod: 25

## 2022-07-12 RX ORDER — BENZONATATE 100 MG/1
100 CAPSULE ORAL 3 TIMES DAILY
Qty: 15 | Refills: 0 | Status: DISCONTINUED | COMMUNITY
Start: 2022-04-19 | End: 2022-07-12

## 2022-07-12 NOTE — HISTORY OF PRESENT ILLNESS
[de-identified] : Here for follow-up of diabetes, hypertension, hyperlipidemia\par \par He states he recently had EGD done last week.\par \par He states he has appointment to see neurologist next week for memory loss\par \par With regards to his bilateral arm pain he states he still gets symptoms from time to time.  He states he tried stopping atorvastatin for 2 weeks but he states it made no difference\par \par He states he has been using clotrimazole cream for his foot dermatitis and he states it has helped.  He is requesting a refill

## 2022-07-12 NOTE — ASSESSMENT
[FreeTextEntry1] : \par B/L upper arm pain\par -Still gets symptoms from time to time although appears mild.\par -He states symptoms improved with exercise\par -He stopped atorvastatin for 2 weeks but it made no difference in his symptoms\par -Check labs again today including CPK\par \par Forgetfulness/Memory loss\par -labs unrevealing\par -He denies feeling depressed or anxious\par -He has appointment to see neurologist 2022\par \par DM type 2\par -he is followed by Endocrine-I previously advised him to follow-up with endocrine but has not made appointment\par -Hemoglobin A1c 6.6 2022\par -metformin 500mg ER 2 tabs PO BID\par -On atorvastatin-LDL 40 2022\par -Foot exam 2022\par -urine A:C 66 previously-I will repeat urine microalbumin at next visit-no urine collected today\par -ophthalmology: 2022 normal exam per pt\par -Fasting labs ordered\par \par Dyslipidemia\par -on atorvastatin \par -Check fasting labs\par \par GERD:\par -On protonix and famotidine\par -He had EGD done last week-I will try to obtain GI EGD report\par \par Fatty liver\par -weight loss advised\par \par Vitamin D Deficiency:\par -vitamin D3 1000 units daily\par \par Abnormal EKG\par -He was seen by cardiology and echo and stress test ordered\par -His stress test was negative \par -he has follow-up with cardiology 2022\par \par Foot Dermatitis:\par -will refill clotrimazole cream\par \par \par HCM:\par \par CPE: 2022\par \par EK2022\par \par Influenza vaccine:2021\par \par Tdap: 2014\par \par Pneumovax:  10/2/2019\par \par Prevnar 20 advised-he should check with insurance to see if covered\par \par Shingles vaccine: will discuss at next visit\par \par Covid vaccine: Pfizer x 2 and Moderna booster\par \par HIV test: 2022 negative\par \par Hepatitis C screenin2016\par \par Depression screenin2022 PHQ 2 score 0 negative\par \par Colonoscopy: 2020 tubular adenoma-repeat 5 years advised\par \par PSA 0.53 2022\par \par F/U 3 months. Labs drawn in office today

## 2022-07-12 NOTE — PHYSICAL EXAM
[No Acute Distress] : no acute distress [Well-Appearing] : well-appearing [Normal Sclera/Conjunctiva] : normal sclera/conjunctiva [PERRL] : pupils equal round and reactive to light [Normal Oropharynx] : the oropharynx was normal [Normal] : normal rate, regular rhythm, normal S1 and S2 and no murmur heard [No Edema] : there was no peripheral edema [Soft] : abdomen soft [Non Tender] : non-tender [Non-distended] : non-distended [No Masses] : no abdominal mass palpated [No HSM] : no HSM [Normal Bowel Sounds] : normal bowel sounds [Normal Affect] : the affect was normal [Alert and Oriented x3] : oriented to person, place, and time [Normal Mood] : the mood was normal [Normal Insight/Judgement] : insight and judgment were intact [Normal Voice/Communication] : normal voice/communication [No Joint Swelling] : no joint swelling [No Focal Deficits] : no focal deficits [de-identified] : Left foot along insole has minimally erythematous maculopapular dermatitis

## 2022-07-12 NOTE — REVIEW OF SYSTEMS
[Fever] : no fever [Chills] : no chills [Fatigue] : no fatigue [Recent Change In Weight] : ~T no recent weight change [Chest Pain] : no chest pain [Palpitations] : no palpitations [Lower Ext Edema] : no lower extremity edema [Shortness Of Breath] : no shortness of breath [Wheezing] : no wheezing [Cough] : no cough [Dyspnea on Exertion] : not dyspnea on exertion [Abdominal Pain] : no abdominal pain [Nausea] : no nausea [Diarrhea] : no diarrhea [Vomiting] : no vomiting [Negative] : Psychiatric [de-identified] : See HPI

## 2022-07-18 LAB
ALBUMIN SERPL ELPH-MCNC: 5 G/DL
ALP BLD-CCNC: 60 U/L
ALT SERPL-CCNC: 25 U/L
ANION GAP SERPL CALC-SCNC: 12 MMOL/L
AST SERPL-CCNC: 21 U/L
BASOPHILS # BLD AUTO: 0.06 K/UL
BASOPHILS NFR BLD AUTO: 1.1 %
BILIRUB SERPL-MCNC: 0.7 MG/DL
BUN SERPL-MCNC: 17 MG/DL
CALCIUM SERPL-MCNC: 10 MG/DL
CHLORIDE SERPL-SCNC: 100 MMOL/L
CHOLEST SERPL-MCNC: 124 MG/DL
CK SERPL-CCNC: 146 U/L
CO2 SERPL-SCNC: 25 MMOL/L
CREAT SERPL-MCNC: 0.83 MG/DL
EGFR: 103 ML/MIN/1.73M2
EOSINOPHIL # BLD AUTO: 0.06 K/UL
EOSINOPHIL NFR BLD AUTO: 1.1 %
ERYTHROCYTE [SEDIMENTATION RATE] IN BLOOD BY WESTERGREN METHOD: 11 MM/HR
ESTIMATED AVERAGE GLUCOSE: 151 MG/DL
GLUCOSE SERPL-MCNC: 106 MG/DL
HBA1C MFR BLD HPLC: 6.9 %
HCT VFR BLD CALC: 44.7 %
HDLC SERPL-MCNC: 44 MG/DL
HGB BLD-MCNC: 14.7 G/DL
IMM GRANULOCYTES NFR BLD AUTO: 0.2 %
LDLC SERPL CALC-MCNC: 56 MG/DL
LYMPHOCYTES # BLD AUTO: 2.45 K/UL
LYMPHOCYTES NFR BLD AUTO: 46.9 %
MAN DIFF?: NORMAL
MCHC RBC-ENTMCNC: 29.3 PG
MCHC RBC-ENTMCNC: 32.9 GM/DL
MCV RBC AUTO: 89.2 FL
MONOCYTES # BLD AUTO: 0.43 K/UL
MONOCYTES NFR BLD AUTO: 8.2 %
NEUTROPHILS # BLD AUTO: 2.21 K/UL
NEUTROPHILS NFR BLD AUTO: 42.5 %
NONHDLC SERPL-MCNC: 80 MG/DL
PLATELET # BLD AUTO: 255 K/UL
POTASSIUM SERPL-SCNC: 5 MMOL/L
PROT SERPL-MCNC: 7.8 G/DL
RBC # BLD: 5.01 M/UL
RBC # FLD: 13.2 %
SODIUM SERPL-SCNC: 137 MMOL/L
TRIGL SERPL-MCNC: 118 MG/DL
WBC # FLD AUTO: 5.22 K/UL

## 2022-07-19 ENCOUNTER — APPOINTMENT (OUTPATIENT)
Dept: NEUROLOGY | Facility: CLINIC | Age: 57
End: 2022-07-19

## 2022-07-19 ENCOUNTER — NON-APPOINTMENT (OUTPATIENT)
Age: 57
End: 2022-07-19

## 2022-07-26 ENCOUNTER — APPOINTMENT (OUTPATIENT)
Dept: GASTROENTEROLOGY | Facility: CLINIC | Age: 57
End: 2022-07-26

## 2022-07-26 VITALS
SYSTOLIC BLOOD PRESSURE: 120 MMHG | RESPIRATION RATE: 16 BRPM | WEIGHT: 180 LBS | HEIGHT: 64 IN | HEART RATE: 65 BPM | DIASTOLIC BLOOD PRESSURE: 70 MMHG | BODY MASS INDEX: 30.73 KG/M2 | OXYGEN SATURATION: 98 %

## 2022-07-26 DIAGNOSIS — K76.0 FATTY (CHANGE OF) LIVER, NOT ELSEWHERE CLASSIFIED: ICD-10-CM

## 2022-07-26 DIAGNOSIS — K30 FUNCTIONAL DYSPEPSIA: ICD-10-CM

## 2022-07-26 DIAGNOSIS — R12 HEARTBURN: ICD-10-CM

## 2022-07-26 DIAGNOSIS — K57.30 DIVERTICULOSIS OF LARGE INTESTINE W/OUT PERFORATION OR ABSCESS W/OUT BLEEDING: ICD-10-CM

## 2022-07-26 DIAGNOSIS — R10.13 EPIGASTRIC PAIN: ICD-10-CM

## 2022-07-26 PROCEDURE — 99214 OFFICE O/P EST MOD 30 MIN: CPT

## 2022-07-26 RX ORDER — MULTIVIT-MIN/FOLIC/VIT K/LYCOP 400-300MCG
25 MCG TABLET ORAL DAILY
Qty: 90 | Refills: 0 | Status: ACTIVE | COMMUNITY
Start: 2022-05-17

## 2022-07-26 RX ORDER — BLOOD SUGAR DIAGNOSTIC
STRIP MISCELLANEOUS
Qty: 400 | Refills: 0 | Status: ACTIVE | COMMUNITY
Start: 2021-09-07

## 2022-07-26 NOTE — HISTORY OF PRESENT ILLNESS
[FreeTextEntry1] : 57-year-old  male non-English-speaking; Cassy SOLORZANO  serves as .\par Patient had previously been seen in 2019 and 25 met:.  Initially had an upper endoscopy performed for dyspeptic symptoms done 4/23/2019 showing chronic gastritis and a slightly disrupted Z-line which otherwise was unrevealing.  Esophageal biopsies were negative for De Paz's esophagus and gastric biopsies were negative for H. pylori.  Patient had been treated with pantoprazole 40 mg p.o. every morning and Pepcid 20 mg p.o. nightly.\par He reports modest persistence morning and in mid afternoon heartburn regurgitation and dyspepsia.  Denies alcohol abuse.  Denies NSAID use.  No GI bleeding.  No obvious weight loss.  No anorexia.  Feels fairly well but still having some breakthrough symptoms.  Denies vomiting weight loss dysphagia or diet aphasia.  Some belching.  No bleeding.\par \par Screening colonoscopy done 8/5/2020 by  revealed diverticulosis and a sigmoid colon small tubular adenoma.  This lesion was removed with cold snare.  Patient had been advised a 5-year interval examination for polyp surveillance.  Hence next colonoscopy due 8/2025.\par Remains asymptomatic from lower GI point of view.

## 2022-07-26 NOTE — PHYSICAL EXAM
[General Appearance - Alert] : alert [General Appearance - In No Acute Distress] : in no acute distress [Sclera] : the sclera and conjunctiva were normal [PERRL With Normal Accommodation] : pupils were equal in size, round, and reactive to light [Extraocular Movements] : extraocular movements were intact [Outer Ear] : the ears and nose were normal in appearance [Oropharynx] : the oropharynx was normal [Auscultation Breath Sounds / Voice Sounds] : lungs were clear to auscultation bilaterally [Heart Rate And Rhythm] : heart rate was normal and rhythm regular [Heart Sounds] : normal S1 and S2 [Heart Sounds Gallop] : no gallops [Murmurs] : no murmurs [Heart Sounds Pericardial Friction Rub] : no pericardial rub [Bowel Sounds] : normal bowel sounds [Abdomen Soft] : soft [Abdomen Tenderness] : non-tender [Abdomen Mass (___ Cm)] : no abdominal mass palpated [FreeTextEntry1] : No scars.  No organomegaly.  No mass tenderness or rebound.  No distention.  No lymphadenopathy. [No CVA Tenderness] : no ~M costovertebral angle tenderness [No Spinal Tenderness] : no spinal tenderness [Abnormal Walk] : normal gait [Nail Clubbing] : no clubbing  or cyanosis of the fingernails [Musculoskeletal - Swelling] : no joint swelling seen [Motor Tone] : muscle strength and tone were normal [Skin Color & Pigmentation] : normal skin color and pigmentation [Skin Turgor] : normal skin turgor [] : no rash

## 2022-07-26 NOTE — ASSESSMENT
[FreeTextEntry1] : Modest chronic gastritis found on upper endoscopy in 4/19.  No H. pylori.  No ulcer disease.  No erosive esophagitis or De Paz's esophagus.  Still modestly symptomatic despite daily doses of pantoprazole and Pepcid.\par Positive fatty liver on recent sonogram.  All LFTs.  Likely bland fatty liver.  Advised aerobic exercise and weight reduction 18 pounds i.e. 10% over the next 1 year.\par For reflux symptoms advised to renew pantoprazole 40 mg p.o. daily.  Increase at bedtime Pepcid to 40 mg p.o. nightly.  It is hoped that better control of nocturnal acid symptoms should positively impact  daytime symptomatology.\par GI office follow-up here in 6 months.\par Personal history of a small tubular adenoma removed from the sigmoid colon in 8/20.  A 5-year polyp surveillance colonoscopy is indicated.  I.e. 8/2025.

## 2022-07-28 ENCOUNTER — RX RENEWAL (OUTPATIENT)
Age: 57
End: 2022-07-28

## 2022-08-14 ENCOUNTER — RX RENEWAL (OUTPATIENT)
Age: 57
End: 2022-08-14

## 2022-08-29 NOTE — ED ADULT NURSE NOTE - NS ED NURSE RECORD ANOTHER VITAL SIGN
The patient has a large bandaid midline on the back.  I have removed this and note the iodoform gauze packing.  As I attempt to remove packing it is noted pus coming out also.  I have gone to get Dr. Matthews.  We remove the packing and no further pus is noted.  Per Dr. Matthews the site looks much improved.  To apply bacitracin and a bandaid done. Patient tolderated entire procedure well.   Natalie DOUGHERTY RN     Yes

## 2022-10-25 ENCOUNTER — APPOINTMENT (OUTPATIENT)
Dept: NEUROLOGY | Facility: CLINIC | Age: 57
End: 2022-10-25

## 2022-10-25 VITALS
HEIGHT: 64 IN | WEIGHT: 184 LBS | BODY MASS INDEX: 31.41 KG/M2 | DIASTOLIC BLOOD PRESSURE: 74 MMHG | SYSTOLIC BLOOD PRESSURE: 116 MMHG

## 2022-10-25 DIAGNOSIS — R41.3 OTHER AMNESIA: ICD-10-CM

## 2022-10-25 DIAGNOSIS — G31.84 MILD COGNITIVE IMPAIRMENT, SO STATED: ICD-10-CM

## 2022-10-25 PROCEDURE — 99204 OFFICE O/P NEW MOD 45 MIN: CPT

## 2022-10-25 RX ORDER — KETOCONAZOLE 20 MG/G
2 CREAM TOPICAL TWICE DAILY
Qty: 1 | Refills: 0 | Status: COMPLETED | COMMUNITY
Start: 2022-07-14 | End: 2022-10-25

## 2022-10-25 RX ORDER — BLOOD-GLUCOSE METER
KIT MISCELLANEOUS
Qty: 1 | Refills: 0 | Status: COMPLETED | COMMUNITY
Start: 2021-09-07 | End: 2022-10-25

## 2022-10-25 RX ORDER — PEN NEEDLE, DIABETIC 32GX 5/32"
32G X 4 MM NEEDLE, DISPOSABLE MISCELLANEOUS
Qty: 1 | Refills: 1 | Status: COMPLETED | COMMUNITY
Start: 2021-10-25 | End: 2022-10-25

## 2022-10-25 RX ORDER — ISOPROPYL ALCOHOL 70 ML/100ML
70 SWAB TOPICAL
Qty: 1 | Refills: 1 | Status: COMPLETED | COMMUNITY
Start: 2021-09-07 | End: 2022-10-25

## 2022-10-25 RX ORDER — CLOTRIMAZOLE 10 MG/G
1 CREAM TOPICAL
Qty: 1 | Refills: 0 | Status: COMPLETED | COMMUNITY
Start: 2021-09-07 | End: 2022-10-25

## 2022-10-25 RX ORDER — PEN NEEDLE, DIABETIC 32GX 5/32"
32G X 4 MM NEEDLE, DISPOSABLE MISCELLANEOUS
Qty: 400 | Refills: 0 | Status: COMPLETED | COMMUNITY
Start: 2021-09-09 | End: 2022-10-25

## 2022-10-25 NOTE — PHYSICAL EXAM
[General Appearance - Alert] : alert [General Appearance - In No Acute Distress] : in no acute distress [General Appearance - Well-Appearing] : healthy appearing [Oriented To Time, Place, And Person] : oriented to person, place, and time [Impaired Insight] : insight and judgment were intact [Affect] : the affect was normal [Memory Recent] : recent memory was not impaired [Person] : oriented to person [Place] : oriented to place [Time] : oriented to time [Short Term Intact] : short term memory intact [Remote Intact] : remote memory intact [Registration Intact] : recent registration memory intact [Concentration Intact] : normal concentrating ability [Naming Objects] : no difficulty naming common objects [Repeating Phrases] : no difficulty repeating a phrase [Fluency] : fluency intact [Comprehension] : comprehension intact [Current Events] : inadequate knowledge of current events [Past History] : adequate knowledge of personal past history [Cranial Nerves Optic (II)] : visual acuity intact bilaterally,  visual fields full to confrontation, pupils equal round and reactive to light [Cranial Nerves Oculomotor (III)] : extraocular motion intact [Cranial Nerves Trigeminal (V)] : facial sensation intact symmetrically [Cranial Nerves Facial (VII)] : face symmetrical [Cranial Nerves Vestibulocochlear (VIII)] : hearing was intact bilaterally [Cranial Nerves Glossopharyngeal (IX)] : tongue and palate midline [Cranial Nerves Accessory (XI - Cranial And Spinal)] : head turning and shoulder shrug symmetric [Cranial Nerves Hypoglossal (XII)] : there was no tongue deviation with protrusion [Motor Tone] : muscle tone was normal in all four extremities [Motor Strength] : muscle strength was normal in all four extremities [No Muscle Atrophy] : normal bulk in all four extremities [Paresis Pronator Drift Right-Sided] : no pronator drift on the right [Paresis Pronator Drift Left-Sided] : no pronator drift on the left [Sensation Tactile Decrease] : light touch was intact [Romberg's Sign] : Romberg's sign was negtive [Sensation Pain / Temperature Decrease] : pain and temperature was intact [Abnormal Walk] : normal gait [Balance] : balance was intact [Past-pointing] : there was no past-pointing [Tremor] : no tremor present [Coordination - Dysmetria Impaired Finger-to-Nose Bilateral] : not present [Coordination - Dysmetria Impaired Heel-to-Shin Bilateral] : not present [2+] : Ankle jerk left 2+ [Plantar Reflex Right Only] : normal on the right [Plantar Reflex Left Only] : normal on the left [FreeTextEntry4] : Short-term recall 3/3\par Could not name president [PERRL With Normal Accommodation] : pupils were equal in size, round, reactive to light, with normal accommodation [Extraocular Movements] : extraocular movements were intact [Full Visual Field] : full visual field

## 2022-10-25 NOTE — REASON FOR VISIT
[Consultation] : a consultation visit [[employer] ] : provided by [employer]   [TWNoteComboBox1] : Citizen of [place]

## 2022-10-25 NOTE — ASSESSMENT
[FreeTextEntry1] : Reports forgetfulness\par Did well on mental status testing in the office today\par To look for possible reversible etiologies we will check a brain MRI, EEG, thyroid functions and B12\par Further discussions to follow

## 2022-10-25 NOTE — HISTORY OF PRESENT ILLNESS
[FreeTextEntry1] : History and examination carried out with \par Reports forgetfulness going on for about the last 7 months\par He forgets things he has to do\par Manages fine with all activities of daily living\par He drives without a problem\par Lives with family and he says they do not complain of his memory\par Works at a [employer] and occasionally forgets ingredients\par He has not done anything that would cause danger to himself or others\par \par Medical history significant for diabetes and hyperlipidemia

## 2022-10-28 ENCOUNTER — RX RENEWAL (OUTPATIENT)
Age: 57
End: 2022-10-28

## 2022-11-01 ENCOUNTER — APPOINTMENT (OUTPATIENT)
Dept: NEUROLOGY | Facility: CLINIC | Age: 57
End: 2022-11-01

## 2022-11-01 PROCEDURE — 95819 EEG AWAKE AND ASLEEP: CPT

## 2022-11-01 PROCEDURE — 93040 RHYTHM ECG WITH REPORT: CPT

## 2022-11-03 NOTE — ED PROCEDURE NOTE - CPROC ED LACERATION CLEANSED1
[Time Spent: ___ minutes] : I have spent [unfilled] minutes of time on the encounter. [>50% of the face to face encounter time was spent on counseling and/or coordination of care for ___] : Greater than 50% of the face to face encounter time was spent on counseling and/or coordination of care for [unfilled] copious irrigation/extensive cleaning

## 2022-11-14 ENCOUNTER — RX RENEWAL (OUTPATIENT)
Age: 57
End: 2022-11-14

## 2022-11-15 ENCOUNTER — OUTPATIENT (OUTPATIENT)
Dept: OUTPATIENT SERVICES | Facility: HOSPITAL | Age: 57
LOS: 1 days | End: 2022-11-15

## 2022-11-15 ENCOUNTER — APPOINTMENT (OUTPATIENT)
Dept: MRI IMAGING | Facility: CLINIC | Age: 57
End: 2022-11-15

## 2022-11-15 DIAGNOSIS — G31.84 MILD COGNITIVE IMPAIRMENT OF UNCERTAIN OR UNKNOWN ETIOLOGY: ICD-10-CM

## 2022-11-15 PROCEDURE — 70551 MRI BRAIN STEM W/O DYE: CPT | Mod: 26

## 2022-11-22 ENCOUNTER — APPOINTMENT (OUTPATIENT)
Dept: CARDIOLOGY | Facility: CLINIC | Age: 57
End: 2022-11-22

## 2022-11-22 ENCOUNTER — APPOINTMENT (OUTPATIENT)
Dept: INTERNAL MEDICINE | Facility: CLINIC | Age: 57
End: 2022-11-22

## 2022-11-22 VITALS
BODY MASS INDEX: 30.39 KG/M2 | DIASTOLIC BLOOD PRESSURE: 79 MMHG | RESPIRATION RATE: 15 BRPM | HEIGHT: 64 IN | HEART RATE: 89 BPM | OXYGEN SATURATION: 98 % | TEMPERATURE: 98 F | WEIGHT: 178 LBS | SYSTOLIC BLOOD PRESSURE: 132 MMHG

## 2022-11-22 DIAGNOSIS — R94.31 ABNORMAL ELECTROCARDIOGRAM [ECG] [EKG]: ICD-10-CM

## 2022-11-22 DIAGNOSIS — L30.9 DERMATITIS, UNSPECIFIED: ICD-10-CM

## 2022-11-22 DIAGNOSIS — R68.89 OTHER GENERAL SYMPTOMS AND SIGNS: ICD-10-CM

## 2022-11-22 DIAGNOSIS — R80.9 PROTEINURIA, UNSPECIFIED: ICD-10-CM

## 2022-11-22 PROCEDURE — 96127 BRIEF EMOTIONAL/BEHAV ASSMT: CPT

## 2022-11-22 PROCEDURE — 90686 IIV4 VACC NO PRSV 0.5 ML IM: CPT

## 2022-11-22 PROCEDURE — G0008: CPT

## 2022-11-22 PROCEDURE — 99214 OFFICE O/P EST MOD 30 MIN: CPT | Mod: 25

## 2022-11-22 RX ORDER — TRIAMCINOLONE ACETONIDE 1 MG/G
0.1 CREAM TOPICAL
Qty: 1 | Refills: 0 | Status: ACTIVE | COMMUNITY
Start: 2021-03-03 | End: 1900-01-01

## 2022-11-22 RX ORDER — PANTOPRAZOLE 40 MG/1
40 TABLET, DELAYED RELEASE ORAL
Qty: 90 | Refills: 0 | Status: DISCONTINUED | COMMUNITY
Start: 2017-03-22 | End: 2022-11-22

## 2022-11-25 PROBLEM — R68.89 FORGETFULNESS: Status: ACTIVE | Noted: 2021-09-27

## 2022-11-25 PROBLEM — R80.9 MICROALBUMINURIA: Status: ACTIVE | Noted: 2022-05-17

## 2022-11-25 PROBLEM — R94.31 ABNORMAL EKG: Status: ACTIVE | Noted: 2022-04-12

## 2022-11-25 RX ORDER — FAMOTIDINE 20 MG/1
20 TABLET, FILM COATED ORAL DAILY
Qty: 90 | Refills: 0 | Status: DISCONTINUED | COMMUNITY
Start: 2022-04-12 | End: 2022-11-25

## 2022-11-25 NOTE — HISTORY OF PRESENT ILLNESS
[de-identified] : Here today for follow-up of diabetes\par \par He was recently seen in by neurology for his forgetfulness and work-up including MRI, EEG, labs was normal\par \par He reports he still gets dermatitis on the top of left foot.  He states when he applies cream that was previously prescribed symptoms improved but states that usually comes back at some point..  He states that it is itchy

## 2022-11-25 NOTE — ASSESSMENT
[FreeTextEntry1] : \par B/L upper arm pain\par -Previously reported\par -No further symptoms reported today\par \par Forgetfulness/Memory loss\par -He was seen by neurology and work-up including MRI of brain, EEG, labs were normal\par -He denies any symptoms today\par \par DM type 2\par -Last hemoglobin A1c was 6.9-check labs\par -metformin 500mg ER 2 tabs PO BID\par -On atorvastatin-LDL 40 2022\par -Foot exam 2022\par -urine A:C 66 previously-check urine microalbumin\par -ophthalmology: 2022 normal exam per pt\par \par Dyslipidemia\par -on atorvastatin \par -Check fasting labs\par \par GERD:\par -On protonix \par -She was previously seen by GI and had EGD\par -He was last seen by GI 2022\par \par Fatty liver\par -weight loss advised\par -Check CMP\par \par Vitamin D Deficiency:\par -vitamin D3 1000 units daily\par -Check vitamin D 25 [place]\par \par Abnormal EKG\par -He was seen by cardiology and echo and stress test ordered\par -His stress test was negative \par -he has follow-up with cardiology 2022\par \par Foot Dermatitis:\par -will prescribe triamcinolone 0.1% cream to use twice daily for 1 to 2 weeks as needed\par -At this point I have advised that he see dermatology for further evaluation\par -She is to notify office if symptoms persist or worsen\par \par \par HCM:\par \par CPE: 2022\par \par EK2022\par \par Influenza vaccine: advised.  R/B discussed.  No egg allergy reported.  VIS given.  Flu shot given today 2022\par \par Tdap: 2014\par \par Pneumovax:  10/2/2019\par \par Prevnar 20: 2022 at pharmacy\par \par Shingles vaccine: Advised 2022-he should check with insurance to see if covered\par \par Covid vaccine: Pfizer x 2 and Moderna booster-COVID-19 bivalent vaccine advised\par \par HIV test: 2022 negative\par \par Hepatitis C screenin2016\par \par Depression screenin2022 PHQ 2 score 0 negative\par \par Colonoscopy: 2020 tubular adenoma-repeat 5 years advised\par \par PSA 0.53 2022\par \par F/U 3 months. Labs drawn in office today

## 2022-11-25 NOTE — PHYSICAL EXAM
[No Acute Distress] : no acute distress [Well-Appearing] : well-appearing [Normal Voice/Communication] : normal voice/communication [Normal Sclera/Conjunctiva] : normal sclera/conjunctiva [PERRL] : pupils equal round and reactive to light [Normal Oropharynx] : the oropharynx was normal [Normal] : normal rate, regular rhythm, normal S1 and S2 and no murmur heard [No Edema] : there was no peripheral edema [Soft] : abdomen soft [Non Tender] : non-tender [Non-distended] : non-distended [No Masses] : no abdominal mass palpated [No HSM] : no HSM [Normal Bowel Sounds] : normal bowel sounds [No Joint Swelling] : no joint swelling [No Focal Deficits] : no focal deficits [Normal Affect] : the affect was normal [Alert and Oriented x3] : oriented to person, place, and time [Normal Mood] : the mood was normal [Normal Insight/Judgement] : insight and judgment were intact [de-identified] : Left foot on top of foot he has minimally erythematous maculopapular dermatitis with scaling

## 2022-11-25 NOTE — REVIEW OF SYSTEMS
[Negative] : Psychiatric [Fever] : no fever [Chills] : no chills [Fatigue] : no fatigue [Chest Pain] : no chest pain [Palpitations] : no palpitations [Lower Ext Edema] : no lower extremity edema [Shortness Of Breath] : no shortness of breath [Wheezing] : no wheezing [Cough] : no cough [Dyspnea on Exertion] : not dyspnea on exertion [Abdominal Pain] : no abdominal pain [Nausea] : no nausea [Diarrhea] : no diarrhea [Vomiting] : no vomiting [FreeTextEntry2] : Lost a few pounds through diet modification [de-identified] : See HPI

## 2022-11-25 NOTE — HEALTH RISK ASSESSMENT
[0] : 2) Feeling down, depressed, or hopeless: Not at all (0) [PHQ-2 Negative - No further assessment needed] : PHQ-2 Negative - No further assessment needed [[id number]] : 0

## 2023-01-03 ENCOUNTER — APPOINTMENT (OUTPATIENT)
Dept: INTERNAL MEDICINE | Facility: CLINIC | Age: 58
End: 2023-01-03

## 2023-01-03 ENCOUNTER — APPOINTMENT (OUTPATIENT)
Dept: GASTROENTEROLOGY | Facility: CLINIC | Age: 58
End: 2023-01-03

## 2023-01-17 ENCOUNTER — FORM ENCOUNTER (OUTPATIENT)
Age: 58
End: 2023-01-17

## 2023-02-02 ENCOUNTER — RX RENEWAL (OUTPATIENT)
Age: 58
End: 2023-02-02

## 2023-02-10 ENCOUNTER — RX RENEWAL (OUTPATIENT)
Age: 58
End: 2023-02-10

## 2023-02-21 ENCOUNTER — APPOINTMENT (OUTPATIENT)
Dept: INTERNAL MEDICINE | Facility: CLINIC | Age: 58
End: 2023-02-21
Payer: COMMERCIAL

## 2023-02-21 ENCOUNTER — NON-APPOINTMENT (OUTPATIENT)
Age: 58
End: 2023-02-21

## 2023-02-21 VITALS
WEIGHT: 184 LBS | RESPIRATION RATE: 15 BRPM | SYSTOLIC BLOOD PRESSURE: 140 MMHG | TEMPERATURE: 97.4 F | HEIGHT: 64 IN | OXYGEN SATURATION: 99 % | HEART RATE: 74 BPM | DIASTOLIC BLOOD PRESSURE: 80 MMHG | BODY MASS INDEX: 31.41 KG/M2

## 2023-02-21 DIAGNOSIS — E55.9 VITAMIN D DEFICIENCY, UNSPECIFIED: ICD-10-CM

## 2023-02-21 DIAGNOSIS — R14.0 ABDOMINAL DISTENSION (GASEOUS): ICD-10-CM

## 2023-02-21 DIAGNOSIS — Z13.31 ENCOUNTER FOR SCREENING FOR DEPRESSION: ICD-10-CM

## 2023-02-21 DIAGNOSIS — M79.602 PAIN IN LEFT ARM: ICD-10-CM

## 2023-02-21 PROCEDURE — 93000 ELECTROCARDIOGRAM COMPLETE: CPT

## 2023-02-21 PROCEDURE — 99214 OFFICE O/P EST MOD 30 MIN: CPT | Mod: 25

## 2023-02-21 PROCEDURE — 96127 BRIEF EMOTIONAL/BEHAV ASSMT: CPT

## 2023-02-21 RX ORDER — PANTOPRAZOLE 40 MG/1
40 TABLET, DELAYED RELEASE ORAL
Qty: 90 | Refills: 1 | Status: DISCONTINUED | COMMUNITY
Start: 2022-07-26 | End: 2023-02-21

## 2023-02-21 NOTE — ASSESSMENT
[FreeTextEntry1] : \par Left arm pain\par -No significant findings on exam\par -EKG today NSR at 64 with poor R wave progression\par -He appears to have been recently seen by cardiology and he states work-up was negative.  He had normal stress test 2022.  He states he was told no further testing was necessary\par -I have advised that he see orthopedics for further evaluation\par \par DM type 2\par -Last hemoglobin A1c was 6.9-fasting labs ordered again today\par -metformin 500mg ER 2 tabs PO BID\par -On atorvastatin-LDL 40 2022\par -Foot exam 2022\par -urine A:C 66 previously-check urine microalbumin\par -ophthalmology: 2022\par \par Dyslipidemia\par -on atorvastatin \par -Check fasting labs\par \par GERD:\par -He reports he continues to have acid reflux symptoms\par -He states he is currently using Protonix 40 mg in the morning and famotidine 40 mg in the evening but he still having symptoms\par -I will stop Protonix and try omeprazole 40 mg daily.  He should continue famotidine 40 mg at bedtime\par -Check labs\par -I have advised that he see GI for further evaluation\par -I have advised that he avoid late night meals\par -I have advised that he avoid foods that exacerbate symptoms\par -Check labs\par \par Abdominal bloating/gas\par -Check labs\par -Check abdominal ultrasound\par -I have advised that he see GI\par \par Fatty liver\par -He has gained 6 pounds\par -weight loss advised\par -Check labs\par \par Vitamin D Deficiency:\par -vitamin D3 1000 units daily\par -Check vitamin D 25 [place]\par \par Elevated blood pressure at 140/80\par -He has gained 6 pounds since last visit\par -He should adhere to a low-fat, low-cholesterol, low carbohydrate diet, low salt diet and weight loss advised\par -Follow-up in 3 months for BP check\par -Risks of hypertension discussed\par \par \par HCM:\par \par CPE: 2022\par \par EK2022\par \par Influenza vaccine: 2022\par \par Tdap: 2014\par \par Pneumovax:  10/2/2019\par \par Prevnar 20: 2022 at pharmacy\par \par Shingles vaccine: He states he received first Shingrix vaccine at pharmacy 2023.  He states second vaccine has been scheduled\par \par Covid vaccine: Pfizer x 2 and Moderna booster-COVID-19 bivalent vaccine advised\par \par HIV test: 2022 negative\par \par Hepatitis C screenin2016\par \par Depression screenin2023 PHQ 2 score 0 negative\par \par Colonoscopy: 2020 tubular adenoma-repeat 5 years advised\par \par PSA 0.53 2022\par \par F/U 3 months.  Fasting labs ordered and he will have done at the lab soon

## 2023-02-21 NOTE — PHYSICAL EXAM
[No Acute Distress] : no acute distress [Well-Appearing] : well-appearing [Normal Voice/Communication] : normal voice/communication [Normal Sclera/Conjunctiva] : normal sclera/conjunctiva [PERRL] : pupils equal round and reactive to light [Normal Oropharynx] : the oropharynx was normal [Normal] : normal rate, regular rhythm, normal S1 and S2 and no murmur heard [No Edema] : there was no peripheral edema [Soft] : abdomen soft [Non Tender] : non-tender [Non-distended] : non-distended [No Masses] : no abdominal mass palpated [No HSM] : no HSM [Normal Bowel Sounds] : normal bowel sounds [No Joint Swelling] : no joint swelling [No Focal Deficits] : no focal deficits [Normal Affect] : the affect was normal [Alert and Oriented x3] : oriented to person, place, and time [Normal Mood] : the mood was normal [Normal Insight/Judgement] : insight and judgment were intact [No Spinal Tenderness] : no spinal tenderness [No Rash] : no rash [de-identified] : Left arm with full range of motion, no arm tenderness, no shoulder, elbow, wrist swelling.  Left shoulder, left elbow with full range of motion

## 2023-02-21 NOTE — REVIEW OF SYSTEMS
[Negative] : Psychiatric [Heartburn] : heartburn [Fever] : no fever [Chills] : no chills [Fatigue] : no fatigue [Chest Pain] : no chest pain [Palpitations] : no palpitations [Lower Ext Edema] : no lower extremity edema [Shortness Of Breath] : no shortness of breath [Wheezing] : no wheezing [Cough] : no cough [Dyspnea on Exertion] : not dyspnea on exertion [Abdominal Pain] : no abdominal pain [Nausea] : no nausea [Diarrhea] : no diarrhea [Vomiting] : no vomiting [FreeTextEntry2] : Gained 6 pounds [FreeTextEntry9] : See HPI

## 2023-02-21 NOTE — HEALTH RISK ASSESSMENT
[0] : 2) Feeling down, depressed, or hopeless: Not at all (0) [PHQ-2 Negative - No further assessment needed] : PHQ-2 Negative - No further assessment needed [[id number]] : 0

## 2023-02-21 NOTE — HISTORY OF PRESENT ILLNESS
[de-identified] : Today for follow-up of diabetes, dyslipidemia, GERD\par \par He reports previously reported foot dermatitis has resolved completely\par \par He states recently he has been having a lot more acid reflux.  He reports he has been using famotidine and Protonix but states he still gets symptoms.  He states he adds Pepto-Bismol when he has symptoms which helps.  He reports some associated abdominal bloating and gas.  He denies weight loss, nausea, vomiting, diarrhea.\par \par He also reports that his entire left arm sometimes hurts.  He states when he exercises it gets better.  He states he has had left arm pain for a long time.  He denies any chest pain, shortness of breath, palpitations.  He denies any recent injuries or trauma.  He states his whole arm hurts and not any particular joint.  He states he feels like it may be his muscles\par \par At last visit I ordered fasting blood work but he states he forgot to have done

## 2023-02-27 ENCOUNTER — FORM ENCOUNTER (OUTPATIENT)
Age: 58
End: 2023-02-27

## 2023-03-03 LAB — HBA1C MFR BLD HPLC: 7.2

## 2023-03-07 ENCOUNTER — APPOINTMENT (OUTPATIENT)
Dept: ORTHOPEDIC SURGERY | Facility: CLINIC | Age: 58
End: 2023-03-07

## 2023-03-07 ENCOUNTER — NON-APPOINTMENT (OUTPATIENT)
Age: 58
End: 2023-03-07

## 2023-03-16 ENCOUNTER — NON-APPOINTMENT (OUTPATIENT)
Age: 58
End: 2023-03-16

## 2023-03-21 ENCOUNTER — OUTPATIENT (OUTPATIENT)
Dept: OUTPATIENT SERVICES | Facility: HOSPITAL | Age: 58
LOS: 1 days | End: 2023-03-21

## 2023-03-21 ENCOUNTER — APPOINTMENT (OUTPATIENT)
Dept: ULTRASOUND IMAGING | Facility: CLINIC | Age: 58
End: 2023-03-21
Payer: COMMERCIAL

## 2023-03-21 DIAGNOSIS — R14.0 ABDOMINAL DISTENSION (GASEOUS): ICD-10-CM

## 2023-03-21 PROCEDURE — 76700 US EXAM ABDOM COMPLETE: CPT | Mod: 26

## 2023-03-22 ENCOUNTER — NON-APPOINTMENT (OUTPATIENT)
Age: 58
End: 2023-03-22

## 2023-04-12 ENCOUNTER — RX CHANGE (OUTPATIENT)
Age: 58
End: 2023-04-12

## 2023-05-09 ENCOUNTER — APPOINTMENT (OUTPATIENT)
Dept: GASTROENTEROLOGY | Facility: CLINIC | Age: 58
End: 2023-05-09

## 2023-05-23 ENCOUNTER — APPOINTMENT (OUTPATIENT)
Dept: INTERNAL MEDICINE | Facility: CLINIC | Age: 58
End: 2023-05-23
Payer: COMMERCIAL

## 2023-05-23 VITALS
DIASTOLIC BLOOD PRESSURE: 80 MMHG | OXYGEN SATURATION: 95 % | SYSTOLIC BLOOD PRESSURE: 124 MMHG | RESPIRATION RATE: 16 BRPM | WEIGHT: 172 LBS | BODY MASS INDEX: 29.37 KG/M2 | HEART RATE: 94 BPM | HEIGHT: 64 IN | TEMPERATURE: 98 F

## 2023-05-23 PROCEDURE — 99214 OFFICE O/P EST MOD 30 MIN: CPT

## 2023-05-23 RX ORDER — ATORVASTATIN CALCIUM 20 MG/1
20 TABLET, FILM COATED ORAL
Qty: 30 | Refills: 5 | Status: DISCONTINUED | COMMUNITY
Start: 2022-06-28 | End: 2023-05-23

## 2023-05-23 RX ORDER — LANCETS 28 GAUGE
EACH MISCELLANEOUS
Qty: 3 | Refills: 3 | Status: ACTIVE | COMMUNITY
Start: 2021-09-07

## 2023-05-23 NOTE — PHYSICAL EXAM
[No Acute Distress] : no acute distress [Well-Appearing] : well-appearing [Normal Voice/Communication] : normal voice/communication [Normal Sclera/Conjunctiva] : normal sclera/conjunctiva [PERRL] : pupils equal round and reactive to light [Normal Oropharynx] : the oropharynx was normal [Normal] : normal rate, regular rhythm, normal S1 and S2 and no murmur heard [No Edema] : there was no peripheral edema [Soft] : abdomen soft [Non Tender] : non-tender [Non-distended] : non-distended [No Masses] : no abdominal mass palpated [No HSM] : no HSM [Normal Bowel Sounds] : normal bowel sounds [No Rash] : no rash [Normal Affect] : the affect was normal [Alert and Oriented x3] : oriented to person, place, and time [Normal Mood] : the mood was normal [Normal Insight/Judgement] : insight and judgment were intact [Comprehensive Foot Exam Normal] : Right and left foot were examined and both feet are normal. No ulcers in either foot. Toes are normal and with full ROM.  Normal tactile sensation with monofilament testing throughout both feet [de-identified] : +2 DP/PT pulses bilaterally

## 2023-05-23 NOTE — ASSESSMENT
[FreeTextEntry1] : \par Left arm pain\par -He states symptoms have resolved\par \par DM type 2\par -Last hemoglobin A1c was 7.2 3/2023.  Fasting labs ordered today\par -I advised him he should be taking metformin 500mg ER 2 tabs PO BID\par -On atorvastatin-lipids at goal 2023\par -Foot exam 2023\par -urine A:C - 2023\par -ophthalmology: 2022-I have advised and referred him to see ophthalmology for annual diabetic eye exam\par \par Dyslipidemia\par -on atorvastatin 20 mg daily\par -Lipids at goal 2023\par -Check fasting labs\par \par Fatty liver\par -LFTs normal 2023\par -Check labs\par -Abdominal ultrasound done 3/2023\par -Weight loss advised\par \par GERD:\par -On omeprazole 40 mg daily and famotidine 40 mg at bedtime\par -I have previously advised that he see GI for further evaluation\par \par Abdominal bloating/gas\par -No further symptoms reported\par -His abdominal ultrasound showed fatty liver 3/2023\par \par Vitamin D Deficiency:\par -vitamin D3 1000 units daily\par -Vitamin D 25 OH level was normal 3/2023\par \par \par HCM:\par \par CPE: 2022\par \par [id number]\par \par Influenza vaccine: 2022\par \par Tdap: 2014\par \par Pneumovax:  10/2/2019\par \par Prevnar 20: 2022 at pharmacy\par \par Shingles vaccine: He states he received first Shingrix vaccine at pharmacy 2023.  He states he will schedule Shingrix No. 2 at pharmacy\par \par Covid vaccine: Pfizer x 2 and Moderna booster-COVID-19 bivalent vaccine advised again today\par \par HIV test: 2022 negative-HIV testing offered 2023 and he consented to testing\par \par Hepatitis C screenin2016\par \par Depression screenin2023 PHQ 2 score 0 negative\par \par Colonoscopy: 2020 tubular adenoma-repeat 5 years advised\par \par PSA 0.53 2022-check PSA\par \par F/U 3 months.  Fasting labs ordered which she will have done at the lab

## 2023-05-23 NOTE — HISTORY OF PRESENT ILLNESS
[de-identified] : Here today for follow-up of diabetes\par \par He reports his glucose fingersticks at home have been between \par \par He states he has been eating healthier and has lost approximately 12 pounds\par \par He states he is only taking his metformin 500 mg twice daily

## 2023-05-29 ENCOUNTER — RX RENEWAL (OUTPATIENT)
Age: 58
End: 2023-05-29

## 2023-05-29 ENCOUNTER — FORM ENCOUNTER (OUTPATIENT)
Age: 58
End: 2023-05-29

## 2023-05-29 RX ORDER — OMEPRAZOLE 40 MG/1
40 CAPSULE, DELAYED RELEASE ORAL
Qty: 90 | Refills: 0 | Status: ACTIVE | COMMUNITY
Start: 2023-02-21 | End: 1900-01-01

## 2023-06-01 LAB — HBA1C MFR BLD HPLC: 6.6

## 2023-06-19 ENCOUNTER — NON-APPOINTMENT (OUTPATIENT)
Age: 58
End: 2023-06-19

## 2023-06-20 ENCOUNTER — OFFICE (OUTPATIENT)
Dept: URBAN - METROPOLITAN AREA CLINIC 116 | Facility: CLINIC | Age: 58
Setting detail: OPHTHALMOLOGY
End: 2023-06-20
Payer: COMMERCIAL

## 2023-06-20 ENCOUNTER — FORM ENCOUNTER (OUTPATIENT)
Age: 58
End: 2023-06-20

## 2023-06-20 DIAGNOSIS — H25.13: ICD-10-CM

## 2023-06-20 DIAGNOSIS — E11.9: ICD-10-CM

## 2023-06-20 PROCEDURE — 92014 COMPRE OPH EXAM EST PT 1/>: CPT | Performed by: OPTOMETRIST

## 2023-06-20 PROCEDURE — 92250 FUNDUS PHOTOGRAPHY W/I&R: CPT | Performed by: OPTOMETRIST

## 2023-06-20 ASSESSMENT — CONFRONTATIONAL VISUAL FIELD TEST (CVF)
OD_FINDINGS: FULL
OS_FINDINGS: FULL

## 2023-06-20 ASSESSMENT — REFRACTION_MANIFEST
OD_ADD: +2.00
OD_SPHERE: PLANO
OD_ADD: +1.75
OD_SPHERE: +0.50
OS_SPHERE: PLANO
OS_ADD: +2.00
OS_VA1: 20/25
OD_VA1: 20/20
OD_VA1: 20/25
OD_VA1: 20/20
OS_AXIS: 145
OD_SPHERE: +0.50
OD_CYLINDER: SPH
OS_VA1: 20/25
OD_ADD: +2.00
OS_ADD: +2.00
OS_VA1: 20/20
OS_CYLINDER: SPH
OS_ADD: +1.75
OD_ADD: +2.00
OS_VA1: 20/20
OS_CYLINDER: -0.25
OS_SPHERE: +0.50
OS_SPHERE: +0.75
OS_CYLINDER: SPH
OS_VA1: 20/25
OD_VA1: 20/25
OD_SPHERE: +0.75
OS_SPHERE: +0.75
OD_VA1: 20/25
OD_CYLINDER: SPH
OS_SPHERE: +0.75
OD_SPHERE: +0.75
OD_AXIS: 15
OS_ADD: +2.00
OD_CYLINDER: -0.25

## 2023-06-20 ASSESSMENT — REFRACTION_CURRENTRX
OD_VPRISM_DIRECTION: SV
OS_VPRISM_DIRECTION: SV
OD_CYLINDER: SPH
OD_OVR_VA: 20/
OD_OVR_VA: 20/
OS_SPHERE: +1.75
OS_CYLINDER: SPH
OS_OVR_VA: 20/
OS_OVR_VA: 20/
OS_VPRISM_DIRECTION: SV
OS_SPHERE: +2.00
OD_CYLINDER: SPH
OS_CYLINDER: SPH
OD_VPRISM_DIRECTION: SV
OD_SPHERE: +2.00
OD_SPHERE: +1.75

## 2023-06-20 ASSESSMENT — SPHEQUIV_DERIVED
OD_SPHEQUIV: 0.625
OD_SPHEQUIV: 0.375
OS_SPHEQUIV: 0.75
OS_SPHEQUIV: 0.375

## 2023-06-20 ASSESSMENT — REFRACTION_AUTOREFRACTION
OS_CYLINDER: 0.00
OD_AXIS: 086
OD_SPHERE: +0.75
OD_CYLINDER: -0.25
OS_AXIS: 000
OS_SPHERE: +0.75

## 2023-06-20 ASSESSMENT — TONOMETRY
OD_IOP_MMHG: 10
OS_IOP_MMHG: 11

## 2023-06-20 ASSESSMENT — KERATOMETRY
OD_K2POWER_DIOPTERS: 42.25
OD_AXISANGLE_DEGREES: 090
OD_K1POWER_DIOPTERS: 42.25

## 2023-06-20 ASSESSMENT — LID POSITION - DERMATOCHALASIS
OS_DERMATOCHALASIS: 2+
OD_DERMATOCHALASIS: 2+

## 2023-06-20 ASSESSMENT — VISUAL ACUITY
OS_BCVA: 20/25
OD_BCVA: 20/25

## 2023-06-20 ASSESSMENT — AXIALLENGTH_DERIVED
OD_AL: 23.9091
OD_AL: 23.8095

## 2023-09-19 ENCOUNTER — APPOINTMENT (OUTPATIENT)
Dept: INTERNAL MEDICINE | Facility: CLINIC | Age: 58
End: 2023-09-19
Payer: COMMERCIAL

## 2023-09-19 VITALS
SYSTOLIC BLOOD PRESSURE: 110 MMHG | BODY MASS INDEX: 29.37 KG/M2 | OXYGEN SATURATION: 95 % | TEMPERATURE: 97.8 F | RESPIRATION RATE: 15 BRPM | WEIGHT: 172 LBS | HEART RATE: 82 BPM | DIASTOLIC BLOOD PRESSURE: 78 MMHG | HEIGHT: 64 IN

## 2023-09-19 DIAGNOSIS — L81.9 DISORDER OF PIGMENTATION, UNSPECIFIED: ICD-10-CM

## 2023-09-19 DIAGNOSIS — Z23 ENCOUNTER FOR IMMUNIZATION: ICD-10-CM

## 2023-09-19 PROCEDURE — G0008: CPT

## 2023-09-19 PROCEDURE — 90686 IIV4 VACC NO PRSV 0.5 ML IM: CPT

## 2023-09-19 PROCEDURE — 99214 OFFICE O/P EST MOD 30 MIN: CPT | Mod: 25

## 2023-10-03 ENCOUNTER — APPOINTMENT (OUTPATIENT)
Dept: DERMATOLOGY | Facility: CLINIC | Age: 58
End: 2023-10-03
Payer: COMMERCIAL

## 2023-10-03 LAB — HBA1C MFR BLD HPLC: 6.8

## 2023-10-03 PROCEDURE — 99204 OFFICE O/P NEW MOD 45 MIN: CPT

## 2023-10-12 NOTE — ED ADULT NURSE NOTE - OBJECTIVE STATEMENT
Sent to PCP-Did Dr. Lincoln refill at [facility]?  Please advise if PCP filling or requesting cardio to take over, thanks  
Instructions: This plan will send the code FBSE to the PM system.  DO NOT or CHANGE the price.
Detail Level: Simple
Price (Do Not Change): 0.00
[facility]. Patient states that he was at work and got his right thumb caught in a bread machine. Patient went to [facility] and was told that he has a fracture and a laceration and was given antibiotics. Patients thumb nail avulsed. No active bleeding.
no loss of consciousness, no gait abnormality, no headache, no sensory deficits, and no weakness.

## 2023-10-21 ENCOUNTER — NON-APPOINTMENT (OUTPATIENT)
Age: 58
End: 2023-10-21

## 2023-10-24 ENCOUNTER — APPOINTMENT (OUTPATIENT)
Dept: INTERNAL MEDICINE | Facility: CLINIC | Age: 58
End: 2023-10-24
Payer: COMMERCIAL

## 2023-10-24 VITALS
HEIGHT: 64 IN | OXYGEN SATURATION: 98 % | DIASTOLIC BLOOD PRESSURE: 70 MMHG | TEMPERATURE: 97.8 F | BODY MASS INDEX: 29.37 KG/M2 | HEART RATE: 76 BPM | SYSTOLIC BLOOD PRESSURE: 120 MMHG | WEIGHT: 172 LBS | RESPIRATION RATE: 16 BRPM

## 2023-10-24 DIAGNOSIS — J34.89 OTHER SPECIFIED DISORDERS OF NOSE AND NASAL SINUSES: ICD-10-CM

## 2023-10-24 DIAGNOSIS — J20.9 ACUTE BRONCHITIS, UNSPECIFIED: ICD-10-CM

## 2023-10-24 DIAGNOSIS — E78.1 PURE HYPERGLYCERIDEMIA: ICD-10-CM

## 2023-10-24 DIAGNOSIS — J06.9 ACUTE UPPER RESPIRATORY INFECTION, UNSPECIFIED: ICD-10-CM

## 2023-10-24 DIAGNOSIS — E67.1 HYPERCAROTENEMIA: ICD-10-CM

## 2023-10-24 PROCEDURE — 99214 OFFICE O/P EST MOD 30 MIN: CPT

## 2023-10-24 RX ORDER — AZITHROMYCIN 250 MG/1
250 TABLET, FILM COATED ORAL
Qty: 6 | Refills: 0 | Status: ACTIVE | COMMUNITY
Start: 2023-10-24 | End: 1900-01-01

## 2023-10-24 RX ORDER — MUPIROCIN 20 MG/G
2 OINTMENT TOPICAL
Qty: 1 | Refills: 0 | Status: ACTIVE | COMMUNITY
Start: 2023-10-24 | End: 1900-01-01

## 2023-10-25 LAB
INFLUENZA A RESULT: NOT DETECTED
INFLUENZA B RESULT: NOT DETECTED
RESP SYN VIRUS RESULT: NOT DETECTED
SARS-COV-2 RESULT: NOT DETECTED

## 2023-12-12 ENCOUNTER — APPOINTMENT (OUTPATIENT)
Dept: INTERNAL MEDICINE | Facility: CLINIC | Age: 58
End: 2023-12-12

## 2024-01-30 ENCOUNTER — APPOINTMENT (OUTPATIENT)
Dept: INTERNAL MEDICINE | Facility: CLINIC | Age: 59
End: 2024-01-30

## 2024-05-21 ENCOUNTER — APPOINTMENT (OUTPATIENT)
Dept: INTERNAL MEDICINE | Facility: CLINIC | Age: 59
End: 2024-05-21

## 2024-06-11 ENCOUNTER — APPOINTMENT (OUTPATIENT)
Dept: INTERNAL MEDICINE | Facility: CLINIC | Age: 59
End: 2024-06-11
Payer: COMMERCIAL

## 2024-06-11 VITALS
OXYGEN SATURATION: 97 % | WEIGHT: 176 LBS | SYSTOLIC BLOOD PRESSURE: 122 MMHG | BODY MASS INDEX: 30.05 KG/M2 | HEART RATE: 79 BPM | RESPIRATION RATE: 15 BRPM | DIASTOLIC BLOOD PRESSURE: 70 MMHG | TEMPERATURE: 97.9 F | HEIGHT: 64 IN

## 2024-06-11 DIAGNOSIS — R05.8 OTHER SPECIFIED COUGH: ICD-10-CM

## 2024-06-11 PROCEDURE — 99203 OFFICE O/P NEW LOW 30 MIN: CPT

## 2024-06-11 RX ORDER — METFORMIN ER 500 MG 500 MG/1
500 TABLET ORAL
Qty: 360 | Refills: 3 | Status: ACTIVE | COMMUNITY
Start: 2021-10-25 | End: 1900-01-01

## 2024-06-11 RX ORDER — FAMOTIDINE 40 MG/1
40 TABLET, FILM COATED ORAL
Qty: 90 | Refills: 3 | Status: ACTIVE | COMMUNITY
Start: 2022-07-26 | End: 1900-01-01

## 2024-06-11 RX ORDER — ATORVASTATIN CALCIUM 20 MG/1
20 TABLET, FILM COATED ORAL
Qty: 90 | Refills: 3 | Status: ACTIVE | COMMUNITY
Start: 2020-11-30 | End: 1900-01-01

## 2024-06-11 NOTE — HISTORY OF PRESENT ILLNESS
[FreeTextEntry1] : Followup [de-identified] : dry cough x 1 week.  getting better now.  hx of poorly controlled GERD. already had EGD, negative h pylori tests.  requesting med refills today as well.   Due for Diabetes follow-up last a1c was in sept 2023 = 6.8 He will come back for AW visit next week

## 2024-06-11 NOTE — ASSESSMENT
[FreeTextEntry1] : , , Diabetes: HbA1C % Counseled patient to cut down on processed sugar and carbohydrates. Increase Aerobic exercise and resistance training.   GERD: Counseled on dietary modifications. Avoid tomato products, mint, citrus (drinks / fruit), fried fatty foods, caffeine, chocolate. Avoid food / drink ,2 hours prior to bedtime / napping -PPI / famotidine -Wil / Rolaids PRN -Gastro eval / H Pylori testing  if not improving   dry cough: pt repots resolution already.

## 2024-06-25 ENCOUNTER — LABORATORY RESULT (OUTPATIENT)
Age: 59
End: 2024-06-25

## 2024-06-25 ENCOUNTER — NON-APPOINTMENT (OUTPATIENT)
Age: 59
End: 2024-06-25

## 2024-06-25 ENCOUNTER — APPOINTMENT (OUTPATIENT)
Dept: INTERNAL MEDICINE | Facility: CLINIC | Age: 59
End: 2024-06-25
Payer: COMMERCIAL

## 2024-06-25 VITALS
DIASTOLIC BLOOD PRESSURE: 80 MMHG | TEMPERATURE: 97.5 F | BODY MASS INDEX: 29.71 KG/M2 | HEIGHT: 64 IN | SYSTOLIC BLOOD PRESSURE: 122 MMHG | OXYGEN SATURATION: 98 % | RESPIRATION RATE: 15 BRPM | WEIGHT: 174 LBS | HEART RATE: 75 BPM

## 2024-06-25 DIAGNOSIS — E66.3 OVERWEIGHT: ICD-10-CM

## 2024-06-25 DIAGNOSIS — K76.0 FATTY (CHANGE OF) LIVER, NOT ELSEWHERE CLASSIFIED: ICD-10-CM

## 2024-06-25 DIAGNOSIS — Z00.00 ENCOUNTER FOR GENERAL ADULT MEDICAL EXAMINATION W/OUT ABNORMAL FINDINGS: ICD-10-CM

## 2024-06-25 DIAGNOSIS — E78.5 HYPERLIPIDEMIA, UNSPECIFIED: ICD-10-CM

## 2024-06-25 DIAGNOSIS — K21.9 GASTRO-ESOPHAGEAL REFLUX DISEASE W/OUT ESOPHAGITIS: ICD-10-CM

## 2024-06-25 DIAGNOSIS — E87.5 HYPERKALEMIA: ICD-10-CM

## 2024-06-25 DIAGNOSIS — E11.9 TYPE 2 DIABETES MELLITUS W/OUT COMPLICATIONS: ICD-10-CM

## 2024-06-25 PROCEDURE — 99396 PREV VISIT EST AGE 40-64: CPT

## 2024-06-25 PROCEDURE — G0444 DEPRESSION SCREEN ANNUAL: CPT | Mod: 59

## 2024-06-25 PROCEDURE — 93000 ELECTROCARDIOGRAM COMPLETE: CPT | Mod: 59

## 2024-06-25 PROCEDURE — 99214 OFFICE O/P EST MOD 30 MIN: CPT | Mod: 25

## 2024-06-26 PROBLEM — E87.5 SERUM POTASSIUM ELEVATED: Status: ACTIVE | Noted: 2024-06-26

## 2024-06-26 LAB
25(OH)D3 SERPL-MCNC: 45.4 NG/ML
ALBUMIN SERPL ELPH-MCNC: 5.1 G/DL
ALP BLD-CCNC: 88 U/L
ALT SERPL-CCNC: 28 U/L
ANION GAP SERPL CALC-SCNC: 13 MMOL/L
APPEARANCE: CLEAR
AST SERPL-CCNC: 21 U/L
BASOPHILS # BLD AUTO: 0.05 K/UL
BASOPHILS NFR BLD AUTO: 0.6 %
BILIRUB SERPL-MCNC: 0.7 MG/DL
BILIRUBIN URINE: NEGATIVE
BLOOD URINE: NEGATIVE
BUN SERPL-MCNC: 19 MG/DL
CALCIUM SERPL-MCNC: 10.1 MG/DL
CHLORIDE SERPL-SCNC: 101 MMOL/L
CHOLEST SERPL-MCNC: 132 MG/DL
CO2 SERPL-SCNC: 25 MMOL/L
COLOR: YELLOW
CREAT SERPL-MCNC: 0.87 MG/DL
EGFR: 101 ML/MIN/1.73M2
EOSINOPHIL # BLD AUTO: 0.09 K/UL
EOSINOPHIL NFR BLD AUTO: 1.1 %
ESTIMATED AVERAGE GLUCOSE: 206 MG/DL
GLUCOSE QUALITATIVE U: NEGATIVE MG/DL
GLUCOSE SERPL-MCNC: 160 MG/DL
HBA1C MFR BLD HPLC: 8.8 %
HCT VFR BLD CALC: 50.8 %
HDLC SERPL-MCNC: 44 MG/DL
HGB BLD-MCNC: 16.2 G/DL
IMM GRANULOCYTES NFR BLD AUTO: 0.2 %
KETONES URINE: NEGATIVE MG/DL
LDLC SERPL CALC-MCNC: 64 MG/DL
LEUKOCYTE ESTERASE URINE: ABNORMAL
LYMPHOCYTES # BLD AUTO: 2.06 K/UL
LYMPHOCYTES NFR BLD AUTO: 25.2 %
MAN DIFF?: NORMAL
MCHC RBC-ENTMCNC: 29 PG
MCHC RBC-ENTMCNC: 31.9 GM/DL
MCV RBC AUTO: 90.9 FL
MONOCYTES # BLD AUTO: 0.48 K/UL
MONOCYTES NFR BLD AUTO: 5.9 %
NEUTROPHILS # BLD AUTO: 5.48 K/UL
NEUTROPHILS NFR BLD AUTO: 67 %
NITRITE URINE: NEGATIVE
NONHDLC SERPL-MCNC: 87 MG/DL
PH URINE: 5.5
PLATELET # BLD AUTO: 255 K/UL
POTASSIUM SERPL-SCNC: 6.1 MMOL/L
PROT SERPL-MCNC: 8.2 G/DL
PROTEIN URINE: 30 MG/DL
PSA FREE FLD-MCNC: 25 %
PSA FREE SERPL-MCNC: 0.24 NG/ML
PSA SERPL-MCNC: 0.95 NG/ML
RBC # BLD: 5.59 M/UL
RBC # FLD: 13.1 %
SODIUM SERPL-SCNC: 138 MMOL/L
SPECIFIC GRAVITY URINE: 1.02
TRIGL SERPL-MCNC: 131 MG/DL
TSH SERPL-ACNC: 1.2 UIU/ML
UROBILINOGEN URINE: 0.2 MG/DL
WBC # FLD AUTO: 8.18 K/UL

## 2024-06-26 RX ORDER — EMPAGLIFLOZIN 25 MG/1
25 TABLET, FILM COATED ORAL DAILY
Qty: 90 | Refills: 3 | Status: ACTIVE | COMMUNITY
Start: 2024-06-26 | End: 1900-01-01

## 2024-10-22 ENCOUNTER — APPOINTMENT (OUTPATIENT)
Dept: INTERNAL MEDICINE | Facility: CLINIC | Age: 59
End: 2024-10-22

## 2024-11-12 ENCOUNTER — APPOINTMENT (OUTPATIENT)
Dept: INTERNAL MEDICINE | Facility: CLINIC | Age: 59
End: 2024-11-12

## 2024-11-12 VITALS
HEART RATE: 65 BPM | WEIGHT: 172 LBS | BODY MASS INDEX: 29.37 KG/M2 | SYSTOLIC BLOOD PRESSURE: 120 MMHG | OXYGEN SATURATION: 98 % | RESPIRATION RATE: 15 BRPM | TEMPERATURE: 97.9 F | DIASTOLIC BLOOD PRESSURE: 70 MMHG | HEIGHT: 64 IN

## 2024-11-12 DIAGNOSIS — E11.9 TYPE 2 DIABETES MELLITUS W/OUT COMPLICATIONS: ICD-10-CM

## 2024-11-12 DIAGNOSIS — E78.5 HYPERLIPIDEMIA, UNSPECIFIED: ICD-10-CM

## 2024-11-12 DIAGNOSIS — E87.5 HYPERKALEMIA: ICD-10-CM

## 2024-11-12 DIAGNOSIS — K21.9 GASTRO-ESOPHAGEAL REFLUX DISEASE W/OUT ESOPHAGITIS: ICD-10-CM

## 2024-11-12 PROCEDURE — 90656 IIV3 VACC NO PRSV 0.5 ML IM: CPT

## 2024-11-12 PROCEDURE — G0008: CPT

## 2024-11-12 PROCEDURE — 99214 OFFICE O/P EST MOD 30 MIN: CPT | Mod: 25

## 2024-11-14 LAB
ALBUMIN SERPL ELPH-MCNC: 4.7 G/DL
ALP BLD-CCNC: 89 U/L
ALT SERPL-CCNC: 25 U/L
ANION GAP SERPL CALC-SCNC: 12 MMOL/L
AST SERPL-CCNC: 18 U/L
BILIRUB SERPL-MCNC: 0.4 MG/DL
BUN SERPL-MCNC: 14 MG/DL
CALCIUM SERPL-MCNC: 10.1 MG/DL
CHLORIDE SERPL-SCNC: 101 MMOL/L
CO2 SERPL-SCNC: 23 MMOL/L
CREAT SERPL-MCNC: 0.7 MG/DL
EGFR: 107 ML/MIN/1.73M2
ESTIMATED AVERAGE GLUCOSE: 189 MG/DL
GLUCOSE SERPL-MCNC: 151 MG/DL
HBA1C MFR BLD HPLC: 8.2 %
POTASSIUM SERPL-SCNC: 4.4 MMOL/L
PROT SERPL-MCNC: 7.7 G/DL
SODIUM SERPL-SCNC: 136 MMOL/L

## 2024-11-14 RX ORDER — ORAL SEMAGLUTIDE 7 MG/1
7 TABLET ORAL
Qty: 90 | Refills: 1 | Status: ACTIVE | COMMUNITY
Start: 2024-11-14 | End: 1900-01-01

## 2024-11-19 PROBLEM — E87.5 HYPERKALEMIA: Status: ACTIVE | Noted: 2024-11-19

## 2024-11-19 PROBLEM — K21.9 CHRONIC GERD: Status: ACTIVE | Noted: 2024-11-19

## 2025-02-11 ENCOUNTER — APPOINTMENT (OUTPATIENT)
Dept: INTERNAL MEDICINE | Facility: CLINIC | Age: 60
End: 2025-02-11

## 2025-02-25 ENCOUNTER — APPOINTMENT (OUTPATIENT)
Dept: INTERNAL MEDICINE | Facility: CLINIC | Age: 60
End: 2025-02-25
Payer: COMMERCIAL

## 2025-02-25 VITALS
RESPIRATION RATE: 15 BRPM | WEIGHT: 166 LBS | OXYGEN SATURATION: 96 % | HEART RATE: 64 BPM | SYSTOLIC BLOOD PRESSURE: 112 MMHG | DIASTOLIC BLOOD PRESSURE: 70 MMHG | TEMPERATURE: 98 F | BODY MASS INDEX: 28.34 KG/M2 | HEIGHT: 64 IN

## 2025-02-25 DIAGNOSIS — K21.9 GASTRO-ESOPHAGEAL REFLUX DISEASE W/OUT ESOPHAGITIS: ICD-10-CM

## 2025-02-25 DIAGNOSIS — M54.9 DORSALGIA, UNSPECIFIED: ICD-10-CM

## 2025-02-25 DIAGNOSIS — E78.5 HYPERLIPIDEMIA, UNSPECIFIED: ICD-10-CM

## 2025-02-25 DIAGNOSIS — E11.9 TYPE 2 DIABETES MELLITUS W/OUT COMPLICATIONS: ICD-10-CM

## 2025-02-25 DIAGNOSIS — E87.5 HYPERKALEMIA: ICD-10-CM

## 2025-02-25 PROCEDURE — 99214 OFFICE O/P EST MOD 30 MIN: CPT

## 2025-02-25 PROCEDURE — G2211 COMPLEX E/M VISIT ADD ON: CPT | Mod: NC

## 2025-02-26 PROBLEM — M54.9 BACK PAIN: Status: ACTIVE | Noted: 2025-02-26

## 2025-03-03 ENCOUNTER — OFFICE (OUTPATIENT)
Dept: URBAN - METROPOLITAN AREA CLINIC 116 | Facility: CLINIC | Age: 60
Setting detail: OPHTHALMOLOGY
End: 2025-03-03
Payer: COMMERCIAL

## 2025-03-03 DIAGNOSIS — H25.13: ICD-10-CM

## 2025-03-03 PROCEDURE — 92250 FUNDUS PHOTOGRAPHY W/I&R: CPT | Performed by: OPTOMETRIST

## 2025-03-03 PROCEDURE — 92014 COMPRE OPH EXAM EST PT 1/>: CPT | Performed by: OPTOMETRIST

## 2025-03-03 ASSESSMENT — REFRACTION_CURRENTRX
OD_OVR_VA: 20/
OD_VPRISM_DIRECTION: SV
OD_OVR_VA: 20/
OS_CYLINDER: SPH
OS_SPHERE: +2.00
OS_OVR_VA: 20/
OS_VPRISM_DIRECTION: SV
OD_SPHERE: +1.00
OS_OVR_VA: 20/
OS_OVR_VA: 20/
OS_SPHERE: +1.75
OD_CYLINDER: SPH
OS_SPHERE: +1.00
OD_VPRISM_DIRECTION: SV
OS_VPRISM_DIRECTION: SV
OD_SPHERE: +1.75
OD_CYLINDER: SPH
OS_CYLINDER: SPH
OD_OVR_VA: 20/
OD_SPHERE: +2.00

## 2025-03-03 ASSESSMENT — REFRACTION_MANIFEST
OD_VA1: 20/25
OD_SPHERE: +0.50
OS_ADD: +2.00
OS_SPHERE: +0.50
OS_SPHERE: +0.75
OD_SPHERE: PLANO
OD_CYLINDER: SPH
OD_SPHERE: +0.75
OD_AXIS: 15
OS_VA1: 20/25
OD_VA1: 20/25
OS_SPHERE: +0.75
OD_ADD: +2.00
OD_VA1: 20/25
OD_CYLINDER: -0.25
OS_CYLINDER: SPH
OD_SPHERE: +0.75
OD_SPHERE: +0.50
OS_CYLINDER: SPH
OS_CYLINDER: SPH
OD_ADD: +2.00
OD_SPHERE: +0.75
OS_VA1: 20/20
OS_VA1: 20/25
OD_CYLINDER: SPH
OS_ADD: +2.00
OD_ADD: +1.75
OS_SPHERE: PLANO
OS_CYLINDER: -0.25
OS_VA1: 20/25
OS_VA1: 20/20
OS_ADD: +2.00
OD_VA1: 20/20
OS_ADD: +1.75
OS_SPHERE: +0.75
OS_SPHERE: +0.75
OS_AXIS: 145
OD_VA1: 20/25
OS_VA1: 20/25
OS_ADD: +2.00
OD_ADD: +2.00
OD_VA1: 20/20
OD_CYLINDER: SPH
OD_ADD: +2.00

## 2025-03-03 ASSESSMENT — REFRACTION_AUTOREFRACTION
OS_SPHERE: +1.00
OD_AXIS: 079
OS_AXIS: 009
OD_CYLINDER: -0.25
OS_CYLINDER: -0.25
OD_SPHERE: +0.75

## 2025-03-03 ASSESSMENT — LID POSITION - DERMATOCHALASIS
OD_DERMATOCHALASIS: 2+
OS_DERMATOCHALASIS: 2+

## 2025-03-03 ASSESSMENT — KERATOMETRY
OS_AXISANGLE_DEGREES: 090
OD_AXISANGLE_DEGREES: 096
OS_K1POWER_DIOPTERS: 42.50
OD_K2POWER_DIOPTERS: 43.00
OD_K1POWER_DIOPTERS: 42.50
OS_K2POWER_DIOPTERS: 42.50

## 2025-03-03 ASSESSMENT — TONOMETRY
OS_IOP_MMHG: 15
OD_IOP_MMHG: 13

## 2025-03-03 ASSESSMENT — CONFRONTATIONAL VISUAL FIELD TEST (CVF)
OS_FINDINGS: FULL
OD_FINDINGS: FULL

## 2025-03-03 ASSESSMENT — VISUAL ACUITY
OD_BCVA: 20/25
OS_BCVA: 20/25

## 2025-03-06 LAB
ALBUMIN SERPL ELPH-MCNC: 4.6 G/DL
ALP BLD-CCNC: 84 U/L
ALT SERPL-CCNC: 26 U/L
ANION GAP SERPL CALC-SCNC: 12 MMOL/L
AST SERPL-CCNC: 19 U/L
BILIRUB SERPL-MCNC: 0.8 MG/DL
BUN SERPL-MCNC: 16 MG/DL
CALCIUM SERPL-MCNC: 10 MG/DL
CHLORIDE SERPL-SCNC: 103 MMOL/L
CO2 SERPL-SCNC: 27 MMOL/L
CREAT SERPL-MCNC: 0.92 MG/DL
EGFR: 96 ML/MIN/1.73M2
ESTIMATED AVERAGE GLUCOSE: 194 MG/DL
GLUCOSE SERPL-MCNC: 162 MG/DL
HBA1C MFR BLD HPLC: 8.4 %
POTASSIUM SERPL-SCNC: 5.7 MMOL/L
PROT SERPL-MCNC: 7.5 G/DL
SODIUM SERPL-SCNC: 142 MMOL/L

## 2025-03-11 RX ORDER — GLIMEPIRIDE 4 MG/1
4 TABLET ORAL
Qty: 90 | Refills: 3 | Status: ACTIVE | COMMUNITY
Start: 2025-03-11 | End: 1900-01-01

## 2025-04-22 NOTE — HISTORY OF PRESENT ILLNESS
Spoke to Cuca, she advised that the echo will cost pt 500 dollars out of pocket.  She would like to know if there is a concern that something is going on with his heart, if this test is necessary?  Please advise.    [de-identified] : Here for follow up\par \par he reports he has been losing weight.  he states he is not trying to lose weight.  he states he feels very thirsty and drinking a lot of cold water.  he states he sometimes feels sleepy.  He denies chest pain, sob, palpitations, abd pain, nausea, vomiting, diarrhea.\par \par He states he is urinating a lot\par \par he does not check FS at home\par \par He states he has some lesions on his penis which he states is due to heat.  he has been using OTC hydrocortisone
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
## 2025-06-02 ENCOUNTER — APPOINTMENT (OUTPATIENT)
Dept: INTERNAL MEDICINE | Facility: CLINIC | Age: 60
End: 2025-06-02

## 2025-07-22 ENCOUNTER — APPOINTMENT (OUTPATIENT)
Dept: INTERNAL MEDICINE | Facility: CLINIC | Age: 60
End: 2025-07-22
Payer: COMMERCIAL

## 2025-07-22 VITALS
SYSTOLIC BLOOD PRESSURE: 118 MMHG | OXYGEN SATURATION: 96 % | WEIGHT: 173 LBS | HEIGHT: 64 IN | TEMPERATURE: 98 F | HEART RATE: 66 BPM | BODY MASS INDEX: 29.53 KG/M2 | RESPIRATION RATE: 15 BRPM | DIASTOLIC BLOOD PRESSURE: 79 MMHG

## 2025-07-22 DIAGNOSIS — E87.5 HYPERKALEMIA: ICD-10-CM

## 2025-07-22 DIAGNOSIS — Z12.11 ENCOUNTER FOR SCREENING FOR MALIGNANT NEOPLASM OF COLON: ICD-10-CM

## 2025-07-22 DIAGNOSIS — Z13.6 ENCOUNTER FOR SCREENING FOR CARDIOVASCULAR DISORDERS: ICD-10-CM

## 2025-07-22 DIAGNOSIS — E66.9 OBESITY, UNSPECIFIED: ICD-10-CM

## 2025-07-22 DIAGNOSIS — Z00.00 ENCOUNTER FOR GENERAL ADULT MEDICAL EXAMINATION W/OUT ABNORMAL FINDINGS: ICD-10-CM

## 2025-07-22 DIAGNOSIS — E78.5 HYPERLIPIDEMIA, UNSPECIFIED: ICD-10-CM

## 2025-07-22 DIAGNOSIS — E11.9 TYPE 2 DIABETES MELLITUS W/OUT COMPLICATIONS: ICD-10-CM

## 2025-07-22 PROCEDURE — 93000 ELECTROCARDIOGRAM COMPLETE: CPT

## 2025-07-22 PROCEDURE — 99396 PREV VISIT EST AGE 40-64: CPT

## 2025-07-22 PROCEDURE — G0447 BEHAVIOR COUNSEL OBESITY 15M: CPT | Mod: NC,59

## 2025-07-22 PROCEDURE — 99214 OFFICE O/P EST MOD 30 MIN: CPT | Mod: 25

## 2025-08-05 LAB
ALBUMIN SERPL ELPH-MCNC: 4.9 G/DL
ALP BLD-CCNC: 81 U/L
ALT SERPL-CCNC: 32 U/L
ANION GAP SERPL CALC-SCNC: 15 MMOL/L
APPEARANCE: CLEAR
AST SERPL-CCNC: 27 U/L
BASOPHILS # BLD AUTO: 0.03 K/UL
BASOPHILS NFR BLD AUTO: 0.6 %
BILIRUB SERPL-MCNC: 0.6 MG/DL
BILIRUBIN URINE: NEGATIVE
BLOOD URINE: NEGATIVE
BUN SERPL-MCNC: 17 MG/DL
CALCIUM SERPL-MCNC: 10 MG/DL
CHLORIDE SERPL-SCNC: 101 MMOL/L
CHOLEST SERPL-MCNC: 123 MG/DL
CO2 SERPL-SCNC: 22 MMOL/L
COLOR: YELLOW
CREAT SERPL-MCNC: 0.83 MG/DL
EGFRCR SERPLBLD CKD-EPI 2021: 101 ML/MIN/1.73M2
EOSINOPHIL # BLD AUTO: 0.09 K/UL
EOSINOPHIL NFR BLD AUTO: 1.8 %
ESTIMATED AVERAGE GLUCOSE: 146 MG/DL
GLUCOSE QUALITATIVE U: NEGATIVE MG/DL
GLUCOSE SERPL-MCNC: 136 MG/DL
HBA1C MFR BLD HPLC: 6.7 %
HCT VFR BLD CALC: 48.7 %
HDLC SERPL-MCNC: 40 MG/DL
HGB BLD-MCNC: 15.9 G/DL
IMM GRANULOCYTES NFR BLD AUTO: 0.4 %
KETONES URINE: NEGATIVE MG/DL
LDLC SERPL-MCNC: 68 MG/DL
LEUKOCYTE ESTERASE URINE: NEGATIVE
LYMPHOCYTES # BLD AUTO: 2.04 K/UL
LYMPHOCYTES NFR BLD AUTO: 41 %
MAN DIFF?: NORMAL
MCHC RBC-ENTMCNC: 28.9 PG
MCHC RBC-ENTMCNC: 32.6 G/DL
MCV RBC AUTO: 88.4 FL
MONOCYTES # BLD AUTO: 0.51 K/UL
MONOCYTES NFR BLD AUTO: 10.3 %
NEUTROPHILS # BLD AUTO: 2.28 K/UL
NEUTROPHILS NFR BLD AUTO: 45.9 %
NITRITE URINE: NEGATIVE
NONHDLC SERPL-MCNC: 83 MG/DL
PH URINE: 6.5
PLATELET # BLD AUTO: 230 K/UL
POTASSIUM SERPL-SCNC: 4.8 MMOL/L
PROT SERPL-MCNC: 7.7 G/DL
PROTEIN URINE: NORMAL MG/DL
PSA FREE FLD-MCNC: 25 %
PSA FREE SERPL-MCNC: 0.17 NG/ML
PSA SERPL-MCNC: 0.66 NG/ML
RBC # BLD: 5.51 M/UL
RBC # FLD: 13.3 %
SODIUM SERPL-SCNC: 138 MMOL/L
SPECIFIC GRAVITY URINE: 1.03
TRIGL SERPL-MCNC: 75 MG/DL
TSH SERPL-ACNC: 1.26 UIU/ML
UROBILINOGEN URINE: 0.2 MG/DL
WBC # FLD AUTO: 4.97 K/UL

## 2025-08-20 ENCOUNTER — APPOINTMENT (OUTPATIENT)
Dept: INTERNAL MEDICINE | Facility: CLINIC | Age: 60
End: 2025-08-20
Payer: COMMERCIAL

## 2025-08-20 VITALS
BODY MASS INDEX: 29.71 KG/M2 | OXYGEN SATURATION: 96 % | HEIGHT: 64 IN | TEMPERATURE: 98 F | DIASTOLIC BLOOD PRESSURE: 81 MMHG | RESPIRATION RATE: 15 BRPM | SYSTOLIC BLOOD PRESSURE: 126 MMHG | WEIGHT: 174 LBS | HEART RATE: 75 BPM

## 2025-08-20 DIAGNOSIS — M51.9 UNSPECIFIED THORACIC, THORACOLUMBAR AND LUMBOSACRAL INTERVERTEBRAL DISC DISORDER: ICD-10-CM

## 2025-08-20 DIAGNOSIS — Z01.818 ENCOUNTER FOR OTHER PREPROCEDURAL EXAMINATION: ICD-10-CM

## 2025-08-20 PROCEDURE — G2211 COMPLEX E/M VISIT ADD ON: CPT | Mod: NC

## 2025-08-20 PROCEDURE — 99214 OFFICE O/P EST MOD 30 MIN: CPT

## 2025-09-09 ENCOUNTER — APPOINTMENT (OUTPATIENT)
Dept: ENDOCRINOLOGY | Facility: CLINIC | Age: 60
End: 2025-09-09

## 2025-09-15 ENCOUNTER — APPOINTMENT (OUTPATIENT)
Dept: GASTROENTEROLOGY | Facility: CLINIC | Age: 60
End: 2025-09-15